# Patient Record
Sex: MALE | Race: WHITE | Employment: FULL TIME | URBAN - METROPOLITAN AREA
[De-identification: names, ages, dates, MRNs, and addresses within clinical notes are randomized per-mention and may not be internally consistent; named-entity substitution may affect disease eponyms.]

---

## 2017-02-20 RX ORDER — BLOOD-GLUCOSE METER
EACH MISCELLANEOUS
COMMUNITY
Start: 2016-02-22

## 2017-02-20 RX ORDER — GEMFIBROZIL 600 MG/1
600 TABLET, FILM COATED ORAL 2 TIMES DAILY
COMMUNITY
Start: 2014-08-20 | End: 2017-05-20

## 2017-02-20 RX ORDER — DILTIAZEM HYDROCHLORIDE 120 MG/1
120 TABLET, FILM COATED ORAL 2 TIMES DAILY
Qty: 180 TABLET | Refills: 3 | Status: SHIPPED | OUTPATIENT
Start: 2017-02-20 | End: 2018-02-10

## 2017-02-20 RX ORDER — GLIMEPIRIDE 4 MG/1
4 TABLET ORAL 2 TIMES DAILY
Qty: 180 TABLET | Refills: 3 | Status: SHIPPED | OUTPATIENT
Start: 2017-02-20 | End: 2017-10-09

## 2017-02-20 RX ORDER — ALBUTEROL SULFATE 90 UG/1
AEROSOL, METERED RESPIRATORY (INHALATION)
COMMUNITY
Start: 2015-01-07 | End: 2017-10-09

## 2017-02-20 RX ORDER — PEN NEEDLE, DIABETIC 32GX 5/32"
32 NEEDLE, DISPOSABLE MISCELLANEOUS 4 TIMES DAILY
Refills: 3 | COMMUNITY
Start: 2017-01-24

## 2017-02-20 RX ORDER — RIVAROXABAN 15 MG/1
15 TABLET, FILM COATED ORAL DAILY
Refills: 3 | COMMUNITY
Start: 2017-01-13 | End: 2020-11-24 | Stop reason: ALTCHOICE

## 2017-02-20 RX ORDER — LINAGLIPTIN 5 MG/1
5 TABLET, FILM COATED ORAL DAILY
Refills: 5 | COMMUNITY
Start: 2017-01-21 | End: 2020-11-24

## 2017-02-20 RX ORDER — LOSARTAN POTASSIUM 50 MG/1
50 TABLET ORAL DAILY
COMMUNITY
Start: 2014-10-17 | End: 2017-08-21

## 2017-02-20 RX ORDER — INSULIN ASPART 100 [IU]/ML
10 INJECTION, SOLUTION INTRAVENOUS; SUBCUTANEOUS 3 TIMES DAILY PRN
Refills: 3 | COMMUNITY
Start: 2016-12-29 | End: 2018-01-05

## 2017-02-20 RX ORDER — LANCETS
EACH MISCELLANEOUS
COMMUNITY
Start: 2014-08-20

## 2017-02-20 RX ORDER — DILTIAZEM HYDROCHLORIDE 120 MG/1
120 TABLET, FILM COATED ORAL 2 TIMES DAILY
COMMUNITY
Start: 2014-08-22 | End: 2017-02-20

## 2017-02-20 RX ORDER — GLIMEPIRIDE 4 MG/1
4 TABLET ORAL 2 TIMES DAILY
COMMUNITY
Start: 2016-02-05 | End: 2017-02-20

## 2017-02-20 RX ORDER — DIGOXIN 125 MCG
125 TABLET ORAL DAILY
COMMUNITY
Start: 2014-08-20 | End: 2018-10-18 | Stop reason: ALTCHOICE

## 2017-02-20 RX ORDER — TAMSULOSIN HYDROCHLORIDE 0.4 MG/1
0.4 CAPSULE ORAL DAILY
COMMUNITY
Start: 2016-08-04 | End: 2017-06-16

## 2017-02-20 RX ORDER — ACETAMINOPHEN 500 MG
500 TABLET ORAL DAILY PRN
COMMUNITY
Start: 2014-08-22

## 2017-02-20 RX ORDER — PEN NEEDLE, DIABETIC 31 GX5/16"
31 NEEDLE, DISPOSABLE MISCELLANEOUS DAILY
Refills: 5 | COMMUNITY
Start: 2016-12-24

## 2017-02-20 NOTE — TELEPHONE ENCOUNTER
No future appointments.   LOV:12/22/16  Return to clinic in 2 months  Suggested date of next visit: 02/22/2017

## 2017-05-22 RX ORDER — GEMFIBROZIL 600 MG/1
TABLET, FILM COATED ORAL
Qty: 180 TABLET | Refills: 0 | Status: SHIPPED | OUTPATIENT
Start: 2017-05-22 | End: 2017-08-20

## 2017-06-16 RX ORDER — TAMSULOSIN HYDROCHLORIDE 0.4 MG/1
0.4 CAPSULE ORAL DAILY
Qty: 60 CAPSULE | Refills: 1 | Status: SHIPPED | OUTPATIENT
Start: 2017-06-16 | End: 2017-10-16

## 2017-06-16 NOTE — TELEPHONE ENCOUNTER
RF request from [facility], [facility] for Tamsulosin. Please advise RF.  Alea Stephens, 06/16/2017, 8:06 AM    Last Labs:  12/20/2016  Last OV:  12/22/2016  Alea Stephens, 06/16/2017, 8:06 AM

## 2017-08-20 DIAGNOSIS — E78.5 DYSLIPIDEMIA: Primary | ICD-10-CM

## 2017-08-21 RX ORDER — LOSARTAN POTASSIUM 50 MG/1
50 TABLET ORAL DAILY
Qty: 30 TABLET | Refills: 0 | Status: SHIPPED | OUTPATIENT
Start: 2017-08-21 | End: 2017-09-17

## 2017-08-21 RX ORDER — GEMFIBROZIL 600 MG/1
TABLET, FILM COATED ORAL
Qty: 180 TABLET | Refills: 0 | Status: SHIPPED | OUTPATIENT
Start: 2017-08-21 | End: 2017-10-09

## 2017-08-21 NOTE — TELEPHONE ENCOUNTER
Patient informed of the below recommendations. Patient will c/b to schedule an appt.  Jose Hogue, 08/21/17, 1:39 PM

## 2017-08-21 NOTE — TELEPHONE ENCOUNTER
Last Labs:  12/20/2016  Last OV:  12/22/2016  Last RF:  1/10/2017  No future appointments.   Julien Stuart, 08/21/17, 7:05 AM

## 2017-09-18 RX ORDER — LOSARTAN POTASSIUM 50 MG/1
TABLET ORAL
Qty: 30 TABLET | Refills: 0 | Status: SHIPPED | OUTPATIENT
Start: 2017-09-18 | End: 2017-11-24

## 2017-09-18 NOTE — TELEPHONE ENCOUNTER
Patient informed of the below recommendations. Patient states he will c/b in the next 10 mins or so to schedule the appts.

## 2017-09-18 NOTE — TELEPHONE ENCOUNTER
Future appt:  None   Last Appointment:  12/22/2016; Suggested date of next visit: 02/22/2017  No results found for: CHOLEST, HDL, LDL, TRIGLY, TRIG  No results found for: EAG, A1C  No results found for: T4F, TSH, TSHT4  Last Labs:  12/20/2016  Last RF:  8/

## 2017-09-30 ENCOUNTER — APPOINTMENT (OUTPATIENT)
Dept: LAB | Age: 52
End: 2017-09-30
Attending: FAMILY MEDICINE
Payer: COMMERCIAL

## 2017-09-30 DIAGNOSIS — E78.5 DYSLIPIDEMIA: ICD-10-CM

## 2017-09-30 PROCEDURE — 36415 COLL VENOUS BLD VENIPUNCTURE: CPT | Performed by: FAMILY MEDICINE

## 2017-09-30 PROCEDURE — 80061 LIPID PANEL: CPT | Performed by: FAMILY MEDICINE

## 2017-09-30 PROCEDURE — 80053 COMPREHEN METABOLIC PANEL: CPT | Performed by: FAMILY MEDICINE

## 2017-10-02 ENCOUNTER — TELEPHONE (OUTPATIENT)
Dept: FAMILY MEDICINE CLINIC | Facility: CLINIC | Age: 52
End: 2017-10-02

## 2017-10-02 NOTE — TELEPHONE ENCOUNTER
----- Message from Pedro Albarran MD sent at 9/30/2017  7:22 PM CDT -----  Kidney function tble  Sugar sl high, managed by Michael  Will discuss further at appt

## 2017-10-02 NOTE — TELEPHONE ENCOUNTER
Your appointments     Date & Time Appointment Department [facility])    Oct 09, 2017  2:00 PM CDT Physical - Established Patient with Earney Schirmer, MD [address] [facility], [facility]        [address]

## 2017-10-09 ENCOUNTER — OFFICE VISIT (OUTPATIENT)
Dept: FAMILY MEDICINE CLINIC | Facility: CLINIC | Age: 52
End: 2017-10-09

## 2017-10-09 VITALS
HEIGHT: 71.5 IN | TEMPERATURE: 99 F | BODY MASS INDEX: 30.56 KG/M2 | RESPIRATION RATE: 16 BRPM | HEART RATE: 80 BPM | DIASTOLIC BLOOD PRESSURE: 78 MMHG | WEIGHT: 223.19 LBS | SYSTOLIC BLOOD PRESSURE: 132 MMHG

## 2017-10-09 DIAGNOSIS — I48.0 PAROXYSMAL ATRIAL FIBRILLATION (HCC): ICD-10-CM

## 2017-10-09 DIAGNOSIS — Z79.4 TYPE 2 DIABETES MELLITUS WITHOUT COMPLICATION, WITH LONG-TERM CURRENT USE OF INSULIN (HCC): ICD-10-CM

## 2017-10-09 DIAGNOSIS — E11.9 TYPE 2 DIABETES MELLITUS WITHOUT COMPLICATION, WITH LONG-TERM CURRENT USE OF INSULIN (HCC): ICD-10-CM

## 2017-10-09 DIAGNOSIS — Z00.00 HEALTH CARE MAINTENANCE: Primary | ICD-10-CM

## 2017-10-09 DIAGNOSIS — D22.9 CHANGE IN MOLE: ICD-10-CM

## 2017-10-09 PROCEDURE — 90471 IMMUNIZATION ADMIN: CPT | Performed by: FAMILY MEDICINE

## 2017-10-09 PROCEDURE — 99396 PREV VISIT EST AGE 40-64: CPT | Performed by: FAMILY MEDICINE

## 2017-10-09 PROCEDURE — 90686 IIV4 VACC NO PRSV 0.5 ML IM: CPT | Performed by: FAMILY MEDICINE

## 2017-10-09 RX ORDER — FLUTICASONE PROPIONATE 50 MCG
2 SPRAY, SUSPENSION (ML) NASAL AS NEEDED
COMMUNITY

## 2017-10-09 RX ORDER — MULTIVITAMIN WITH IRON
1 TABLET ORAL DAILY
COMMUNITY
End: 2018-12-26

## 2017-10-09 RX ORDER — PROPAFENONE HYDROCHLORIDE 150 MG/1
1 TABLET, FILM COATED ORAL 3 TIMES DAILY
Refills: 1 | COMMUNITY
Start: 2017-08-22 | End: 2019-10-23 | Stop reason: ALTCHOICE

## 2017-10-09 RX ORDER — GEMFIBROZIL 600 MG/1
600 TABLET, FILM COATED ORAL 2 TIMES DAILY
Qty: 180 TABLET | Refills: 1 | Status: SHIPPED | OUTPATIENT
Start: 2017-10-09 | End: 2018-05-15

## 2017-10-09 NOTE — PATIENT INSTRUCTIONS
Flu vaccine today  Needs dermatology eval of lesion to right side of nose  Needs repair of herina  Continue f/u with Endo for DM  Recheck lipid panel next march

## 2017-10-10 PROBLEM — D22.9 CHANGE IN MOLE: Status: ACTIVE | Noted: 2017-10-10

## 2017-10-11 NOTE — PROGRESS NOTES
[facility] [place]  PROGRESS NOTE  Chief Complaint:   Patient presents with:  Physical  Lab Results: 9/30  Imm/Inj: Flu shot      HPI:   This is a 47year old male coming in for yearly physical.  Overall patient states feels pretty good reports Bilirubin, Total 0.6 0.1 - 2.0 mg/dL   Total Protein 8.0 6.1 - 8.3 g/dL   Albumin 4.1 3.5 - 4.8 g/dL   Sodium 139 136 - 144 mmol/L   Potassium 4.1 3.6 - 5.1 mmol/L   Chloride 105 101 - 111 mmol/L   CO2 24.0 22.0 - 32.0 mmol/L   -LIPID PANEL   Result Valu BY MOUTH DAILY Disp: 30 tablet Rfl: 0   tamsulosin HCl (FLOMAX) 0.4 MG Oral Cap Take 1 capsule (0.4 mg total) by mouth daily. Disp: 60 capsule Rfl: 1   acetaminophen (TYLENOL EXTRA STRENGTH) 500 MG Oral Tab Take 500 mg by mouth daily as needed.  Disp:  Rfl: rest.  RESPIRATORY:  Denies shortness of breath, wheezing, cough or sputum. GASTROINTESTINAL:  Denies abdominal pain, nausea, vomiting, constipation, diarrhea, or blood in stool.   MUSCULOSKELETAL:  Denies weakness, muscle aches, back pain, joint pain, swe biopsy. HEART:  Regular rate and rhythm, no murmurs, rubs or gallops. LUNGS: Clear to auscultation bilterally, no rales/rhonchi/wheezing. CHEST: No tenderness. ABDOMEN:  Soft, nondistended, nontender,, no masses, no hepatosplenomegaly.   BACK: No tender questions, complications, allergies, or worsening or changing symptoms. Patient is to call with any side effects or complications from the treatments as a result of today.      Problem List:  Patient Active Problem List:     Long term current use of antico

## 2017-10-16 RX ORDER — LOSARTAN POTASSIUM 50 MG/1
TABLET ORAL
Qty: 30 TABLET | Refills: 6 | Status: SHIPPED | OUTPATIENT
Start: 2017-10-16 | End: 2018-10-22

## 2017-10-16 RX ORDER — TAMSULOSIN HYDROCHLORIDE 0.4 MG/1
CAPSULE ORAL
Qty: 60 CAPSULE | Refills: 6 | Status: SHIPPED | OUTPATIENT
Start: 2017-10-16 | End: 2018-12-26

## 2017-10-16 NOTE — TELEPHONE ENCOUNTER
Future appt:    Last Appointment:  10/9/2017    Cholesterol, Total (mg/dL)   Date Value   09/30/2017 183   ----------  HDL Cholesterol (mg/dL)   Date Value   09/30/2017 36 (L)   ----------  LDL Cholesterol (mg/dL)   Date Value   09/30/2017 109   ----------

## 2017-11-17 RX ORDER — GEMFIBROZIL 600 MG/1
TABLET, FILM COATED ORAL
Qty: 180 TABLET | Refills: 0 | OUTPATIENT
Start: 2017-11-17

## 2017-11-17 NOTE — TELEPHONE ENCOUNTER
Gemfibrozil last RF was 10/9/17 180 tab, 1 refill. Should not be due for a refill.      Called pharmacy and they said that they had already taken care of it and it was a duplicate request.

## 2017-11-24 ENCOUNTER — OFFICE VISIT (OUTPATIENT)
Dept: FAMILY MEDICINE CLINIC | Facility: CLINIC | Age: 52
End: 2017-11-24

## 2017-11-24 VITALS
TEMPERATURE: 98 F | SYSTOLIC BLOOD PRESSURE: 132 MMHG | WEIGHT: 226.19 LBS | HEIGHT: 71.5 IN | RESPIRATION RATE: 20 BRPM | BODY MASS INDEX: 30.97 KG/M2 | HEART RATE: 76 BPM | DIASTOLIC BLOOD PRESSURE: 72 MMHG

## 2017-11-24 DIAGNOSIS — H60.12 CELLULITIS OF LEFT EAR: Primary | ICD-10-CM

## 2017-11-24 DIAGNOSIS — Z79.4 TYPE 2 DIABETES MELLITUS WITHOUT COMPLICATION, WITH LONG-TERM CURRENT USE OF INSULIN (HCC): ICD-10-CM

## 2017-11-24 DIAGNOSIS — E11.9 TYPE 2 DIABETES MELLITUS WITHOUT COMPLICATION, WITH LONG-TERM CURRENT USE OF INSULIN (HCC): ICD-10-CM

## 2017-11-24 PROCEDURE — 99214 OFFICE O/P EST MOD 30 MIN: CPT | Performed by: FAMILY MEDICINE

## 2017-11-24 RX ORDER — AMOXICILLIN AND CLAVULANATE POTASSIUM 875; 125 MG/1; MG/1
1 TABLET, FILM COATED ORAL 2 TIMES DAILY
Qty: 20 TABLET | Refills: 0 | Status: SHIPPED | OUTPATIENT
Start: 2017-11-24 | End: 2017-12-04

## 2017-11-24 RX ORDER — CLINDAMYCIN HYDROCHLORIDE 150 MG/1
1 CAPSULE ORAL EVERY 12 HOURS
Refills: 0 | COMMUNITY
Start: 2017-11-20 | End: 2018-01-05 | Stop reason: ALTCHOICE

## 2017-11-24 NOTE — PROGRESS NOTES
Chief Complaint:   Patient presents with:  Ear Pain: left side, ringing in both ears. HPI:   This is a 47year old male coming in for pain in the left ear canal.    Symptoms have been present for 5-7 days. Patient initially noted pimple.     He has Glucose Blood (DILLAN CONTOUR NEXT TEST) In Vitro Strip  Disp:  Rfl:    Glucose Blood (ACCU-CHEK SMARTVIEW) In Vitro Strip  Disp:  Rfl:    Insulin Glargine (LANTUS SOLOSTAR) 100 UNIT/ML Subcutaneous Solution Pen-injector Take 10 Units by mouth nightly.  Becki Hyatt groomed. Physical Exam:    GEN:  Patient is alert, awake and oriented, well developed, well nourished  male   , no apparent distress.   HEENT:  Head:  Normocephalic, atraumatic     Eyes: EOMI, PERRLA, no scleral icterus, conjunctivae clear bilaterally, n

## 2017-12-04 ENCOUNTER — TELEPHONE (OUTPATIENT)
Dept: FAMILY MEDICINE CLINIC | Facility: CLINIC | Age: 52
End: 2017-12-04

## 2017-12-04 NOTE — TELEPHONE ENCOUNTER
Patient states that he stopped in the other day and stopped at the  and asked to speak to Dr. Samara Mclaughlin nurse about stopping his medication for up coming procedure.   He was told she was away from her desk but would leave a message for her to retu

## 2017-12-04 NOTE — TELEPHONE ENCOUNTER
Pt states he called a few days ago and has not received a call back. Having surgery and needs to know when to stop his meds. Please call back this afternoon.

## 2017-12-11 ENCOUNTER — TELEPHONE (OUTPATIENT)
Dept: FAMILY MEDICINE CLINIC | Facility: CLINIC | Age: 52
End: 2017-12-11

## 2017-12-11 ENCOUNTER — OFFICE VISIT (OUTPATIENT)
Dept: FAMILY MEDICINE CLINIC | Facility: CLINIC | Age: 52
End: 2017-12-11

## 2017-12-11 VITALS
SYSTOLIC BLOOD PRESSURE: 120 MMHG | BODY MASS INDEX: 30 KG/M2 | OXYGEN SATURATION: 97 % | DIASTOLIC BLOOD PRESSURE: 62 MMHG | HEART RATE: 92 BPM | WEIGHT: 218.63 LBS | TEMPERATURE: 98 F

## 2017-12-11 DIAGNOSIS — R33.9 URINARY RETENTION: Primary | ICD-10-CM

## 2017-12-11 PROBLEM — N28.89 RENAL MASS: Status: ACTIVE | Noted: 2017-12-11

## 2017-12-11 PROBLEM — C64.9 RENAL CELL CARCINOMA (HCC): Status: ACTIVE | Noted: 2017-02-01

## 2017-12-11 PROBLEM — K43.2 INCISIONAL HERNIA: Status: ACTIVE | Noted: 2017-10-24

## 2017-12-11 PROCEDURE — 51701 INSERT BLADDER CATHETER: CPT | Performed by: NURSE PRACTITIONER

## 2017-12-11 PROCEDURE — 99214 OFFICE O/P EST MOD 30 MIN: CPT | Performed by: NURSE PRACTITIONER

## 2017-12-11 PROCEDURE — 81003 URINALYSIS AUTO W/O SCOPE: CPT | Performed by: NURSE PRACTITIONER

## 2017-12-11 RX ORDER — CEPHALEXIN 500 MG/1
500 CAPSULE ORAL
COMMUNITY
Start: 2017-12-08 | End: 2018-01-05 | Stop reason: ALTCHOICE

## 2017-12-11 RX ORDER — HYDROCODONE BITARTRATE AND ACETAMINOPHEN 5; 325 MG/1; MG/1
1 TABLET ORAL
COMMUNITY
Start: 2017-12-08 | End: 2018-10-18 | Stop reason: ALTCHOICE

## 2017-12-11 RX ORDER — DOCUSATE SODIUM 100 MG/1
100 CAPSULE, LIQUID FILLED ORAL
COMMUNITY
Start: 2017-12-08 | End: 2018-01-05 | Stop reason: ALTCHOICE

## 2017-12-11 NOTE — PROGRESS NOTES
Landon Silva is a 47year old male. Patient presents with:  Urinary: pressure       HPI:   Complaints of urinary pressure-   Had surgery Thursday (12/7) morning- felt like he couldn't empty bladder since then.   He did have a hackett   No dysuria, no blood, MG Oral Tab Take 1 tablet by mouth 3 (three) times daily. Disp:  Rfl: 1   Fluticasone Propionate 50 MCG/ACT Nasal Suspension 2 sprays by Each Nare route as needed for Rhinitis.  Disp:  Rfl:    gemfibrozil 600 MG Oral Tab Take 1 tablet (600 mg total) by mout tobacco: Never Used                      Alcohol use:  Yes              Comment: rarely     Family History   Problem Relation Age of Onset   • Hypertension Father    • Thyroid disease Father    • Heart Disorder Father    • Hypertension Sister    • Thyroid d retention  (primary encounter diagnosis)      Orders Placed This Encounter      UA/M With Culture Reflex [E]      Urine Dip, auto without Micro    Meds & Refills for this Visit:    No prescriptions requested or ordered in this encounter       Imaging & Con

## 2017-12-11 NOTE — TELEPHONE ENCOUNTER
I am leaving for the day- please see if patient  Has voided- if he is unable to void - he can call Dr. Rory Contreras office [phone]. If he is not able to get an appointment - or if he is very uncomfortable he should go to the urgent care or ER.

## 2017-12-11 NOTE — PATIENT INSTRUCTIONS
Discussed with patient  That this is likely due to recent indwelling catheter, if he is still unable to void- he should call us this afternoon- I would like him to see a urologist for evaluation and possible insertion of indwelling catheter.

## 2017-12-11 NOTE — TELEPHONE ENCOUNTER
Patient states he has not urinated since being straight cath but he has only drank one bottle of water but will call and let us know when he does.  Expressed understanding

## 2017-12-12 ENCOUNTER — TELEPHONE (OUTPATIENT)
Dept: FAMILY MEDICINE CLINIC | Facility: CLINIC | Age: 52
End: 2017-12-12

## 2017-12-12 NOTE — TELEPHONE ENCOUNTER
Patient's wife Fahad Monique referred patient to Dr. Sarina Tavarez. Informed wife I will contact his office and return her call. Spoke with Marielos Jackson at Dr. Marisa Manzano office who will see if patient can be worked in and return my call.      Patient's wi

## 2017-12-12 NOTE — TELEPHONE ENCOUNTER
----- Message from ISIS Sheppard sent at 12/12/2017  9:36 AM CST -----  Please notify patient that his urinalysis is normal–he does not have a urinary tract infection. Please asked patient if he is able to void on his own.   (He was straight cathed

## 2017-12-12 NOTE — TELEPHONE ENCOUNTER
Patient's wife Suzie Pollard states patient was seen yesterday by Kiersten Cuello because he has been having an abnormal urine flow since he came home from the hospital on Friday. States Kiersten Cuello put a catheter in but could not leave the catheter in due to protocol.   Wife s

## 2017-12-13 NOTE — TELEPHONE ENCOUNTER
Lowry Bamberger referred patient to see Dr. Ignacia Rivera. However, he is not able to be seen until jan 8th. Patient needs to be seen sooner.  Do you recommend another urologist? Please advise

## 2017-12-14 NOTE — TELEPHONE ENCOUNTER
Keflex does get into the urine and would be helpful, however typically cathaters do not get infected.   Push fluids  Careful with showering not to pull on tube, wash like normal

## 2017-12-14 NOTE — TELEPHONE ENCOUNTER
Gabriela Sofia states that they are unable to get the patient in urgently due to the holidays coming up and both providers are taking week long vacations.  Gabriela Sofia states that if he is still having urinary difficulties to go back to the ER if not they do have him

## 2017-12-14 NOTE — TELEPHONE ENCOUNTER
The 20th is much better, if pt not able to urinate at all, recommend ER, then they will get f/u in 1-2 days

## 2017-12-14 NOTE — TELEPHONE ENCOUNTER
Wife is very disappointed that the patient has to wait until the 20th for an appt. Explained to her that this is the best they could do considering that the holidays are coming up and that both urologists are taking time off.  Wife is concerned about going

## 2017-12-14 NOTE — TELEPHONE ENCOUNTER
Let pt's wife know the following below. Pt's wife verbalized her understanding and had no other questions at this time.

## 2017-12-14 NOTE — TELEPHONE ENCOUNTER
Can we check with Dr Velazquez [facility] office to see if he  Or Dr Ailyn Gu can see urgently due to urinary retention since having surgery

## 2017-12-28 ENCOUNTER — TELEPHONE (OUTPATIENT)
Dept: FAMILY MEDICINE CLINIC | Facility: CLINIC | Age: 52
End: 2017-12-28

## 2017-12-28 DIAGNOSIS — R33.9 URINARY RETENTION: Primary | ICD-10-CM

## 2017-12-28 DIAGNOSIS — Z01.818 PREOPERATIVE CLEARANCE: ICD-10-CM

## 2017-12-28 NOTE — TELEPHONE ENCOUNTER
Received fax from [facility]  Patient having a photoselective vaporization of prostate with Dr. Emi Mcclelland on 1/12/17  Patient needs pre-op clearance, labs, and chest xray  Reviewed with Dr. Mervin Gomez lab and xray appt for tomorrow and appt with

## 2017-12-29 ENCOUNTER — HOSPITAL ENCOUNTER (OUTPATIENT)
Dept: GENERAL RADIOLOGY | Age: 52
Discharge: HOME | End: 2017-12-29
Attending: FAMILY MEDICINE
Payer: COMMERCIAL

## 2017-12-29 ENCOUNTER — LABORATORY ENCOUNTER (OUTPATIENT)
Dept: LAB | Age: 52
End: 2017-12-29
Attending: FAMILY MEDICINE
Payer: COMMERCIAL

## 2017-12-29 DIAGNOSIS — R33.9 URINARY RETENTION: ICD-10-CM

## 2017-12-29 DIAGNOSIS — Z01.818 PREOPERATIVE CLEARANCE: ICD-10-CM

## 2017-12-29 PROCEDURE — 85025 COMPLETE CBC W/AUTO DIFF WBC: CPT | Performed by: FAMILY MEDICINE

## 2017-12-29 PROCEDURE — 85610 PROTHROMBIN TIME: CPT | Performed by: FAMILY MEDICINE

## 2017-12-29 PROCEDURE — 36415 COLL VENOUS BLD VENIPUNCTURE: CPT | Performed by: FAMILY MEDICINE

## 2017-12-29 PROCEDURE — 87186 SC STD MICRODIL/AGAR DIL: CPT | Performed by: FAMILY MEDICINE

## 2017-12-29 PROCEDURE — 85730 THROMBOPLASTIN TIME PARTIAL: CPT | Performed by: FAMILY MEDICINE

## 2017-12-29 PROCEDURE — 80053 COMPREHEN METABOLIC PANEL: CPT | Performed by: FAMILY MEDICINE

## 2017-12-29 PROCEDURE — 71020 XR CHEST PA + LAT CHEST (CPT=71020): CPT | Performed by: FAMILY MEDICINE

## 2017-12-29 PROCEDURE — 87088 URINE BACTERIA CULTURE: CPT | Performed by: FAMILY MEDICINE

## 2017-12-29 PROCEDURE — 87086 URINE CULTURE/COLONY COUNT: CPT | Performed by: FAMILY MEDICINE

## 2018-01-05 ENCOUNTER — OFFICE VISIT (OUTPATIENT)
Dept: FAMILY MEDICINE CLINIC | Facility: CLINIC | Age: 53
End: 2018-01-05

## 2018-01-05 VITALS
WEIGHT: 218 LBS | RESPIRATION RATE: 16 BRPM | TEMPERATURE: 98 F | DIASTOLIC BLOOD PRESSURE: 68 MMHG | HEIGHT: 70.5 IN | SYSTOLIC BLOOD PRESSURE: 124 MMHG | BODY MASS INDEX: 30.86 KG/M2 | HEART RATE: 92 BPM

## 2018-01-05 DIAGNOSIS — Z01.818 PREOP EXAMINATION: Primary | ICD-10-CM

## 2018-01-05 DIAGNOSIS — N30.00 ACUTE CYSTITIS WITHOUT HEMATURIA: ICD-10-CM

## 2018-01-05 DIAGNOSIS — R33.9 URINARY RETENTION: ICD-10-CM

## 2018-01-05 PROBLEM — N28.89 RENAL MASS: Status: RESOLVED | Noted: 2017-12-11 | Resolved: 2018-01-05

## 2018-01-05 PROBLEM — D03.30 MELANOMA IN SITU OF FACE (HCC): Status: ACTIVE | Noted: 2017-12-13

## 2018-01-05 PROBLEM — H60.12 CELLULITIS OF LEFT EAR: Status: RESOLVED | Noted: 2017-11-24 | Resolved: 2018-01-05

## 2018-01-05 PROBLEM — D22.9 CHANGE IN MOLE: Status: RESOLVED | Noted: 2017-10-10 | Resolved: 2018-01-05

## 2018-01-05 PROCEDURE — 87086 URINE CULTURE/COLONY COUNT: CPT | Performed by: FAMILY MEDICINE

## 2018-01-05 PROCEDURE — 99214 OFFICE O/P EST MOD 30 MIN: CPT | Performed by: FAMILY MEDICINE

## 2018-01-05 RX ORDER — SULFAMETHOXAZOLE AND TRIMETHOPRIM 800; 160 MG/1; MG/1
1 TABLET ORAL 2 TIMES DAILY
Refills: 0 | COMMUNITY
Start: 2018-01-01 | End: 2018-01-19 | Stop reason: ALTCHOICE

## 2018-01-05 RX ORDER — FINASTERIDE 5 MG/1
1 TABLET, FILM COATED ORAL DAILY
Refills: 6 | COMMUNITY
Start: 2017-12-20 | End: 2018-10-18 | Stop reason: ALTCHOICE

## 2018-01-05 NOTE — PROGRESS NOTES
[address]  PROGRESS NOTE  Chief Complaint:   Patient presents with:  Pre-Op Exam: Dr. Julia Dobbins on 1-12-17.        HPI:   This is a 47year old male coming in for preop clearance for upcoming prostate surgery to be done by Dr. Julia Dobbins at [facility] test   by cardiology Dr. Linus Harrison on January 8 for his preoperative clearance from cardiology. Patient denies any recent illnesses. Patient understands the procedure be done.   Patient is a known diabetic his blood sugars that she had been doing quite well Granulocyte Absolute 0.05 0.00 - 1.00 x10(3) uL   Neutrophil % 74.6 %   Lymphocyte % 15.0 %   Monocyte % 5.8 %   Eosinophil % 3.2 %   Basophil % 0.8 %   Immature Granulocyte % 0.6 %       Past Medical History:   Diagnosis Date   • Atrial fibrillation ([facility] [address] DAILY Disp: 30 tablet Rfl: 6   Omega-3 Fatty Acids (FISH OIL CONCENTRATE) 300 MG Oral Cap Take 1 capsule by mouth daily. Disp:  Rfl:    Propafenone HCl 150 MG Oral Tab Take 1 tablet by mouth 3 (three) times daily.  Disp:  Rfl: 1   Fluticasone Propionate 50 Denies chest pain, chest pressure, chest discomfort, palpitations, edema, dyspnea on exertion or at rest.  RESPIRATORY:  Denies shortness of breath, wheezing, cough or sputum.   GASTROINTESTINAL:  Denies abdominal pain, nausea, vomiting, constipation, diarr or gallops. LUNGS: Clear to auscultation bilterally, no rales/rhonchi/wheezing. CHEST: No tenderness. ABDOMEN:  Soft, nondistended, nontender,, no masses, no hepatosplenomegaly. ,  Catheter in place with clear light yellow urine present in the bag.   BACK ([facility] [address]     Nonspecific abnormal results of kidney function study     Type 2 diabetes mellitus without complication, with long-term current use of insulin (HCC)     Familial multiple lipoprotein-type hyperlipidemia     Renal cell carcinoma of right kidney (H

## 2018-01-08 ENCOUNTER — TELEPHONE (OUTPATIENT)
Dept: FAMILY MEDICINE CLINIC | Facility: CLINIC | Age: 53
End: 2018-01-08

## 2018-01-08 NOTE — TELEPHONE ENCOUNTER
----- Message from Benny Jacob MD sent at 1/7/2018  2:05 PM CST -----  Urine culture neg at 24 hours  Please forward preop, labs and CXR to [facility]

## 2018-01-19 ENCOUNTER — OFFICE VISIT (OUTPATIENT)
Dept: FAMILY MEDICINE CLINIC | Facility: CLINIC | Age: 53
End: 2018-01-19

## 2018-01-19 VITALS
DIASTOLIC BLOOD PRESSURE: 70 MMHG | SYSTOLIC BLOOD PRESSURE: 132 MMHG | TEMPERATURE: 97 F | OXYGEN SATURATION: 98 % | WEIGHT: 214.19 LBS | HEART RATE: 106 BPM | BODY MASS INDEX: 30 KG/M2

## 2018-01-19 DIAGNOSIS — J40 BRONCHITIS AFTER SURGERY: Primary | ICD-10-CM

## 2018-01-19 DIAGNOSIS — E11.9 TYPE 2 DIABETES MELLITUS WITHOUT COMPLICATION, WITH LONG-TERM CURRENT USE OF INSULIN (HCC): ICD-10-CM

## 2018-01-19 DIAGNOSIS — J95.89 BRONCHITIS AFTER SURGERY: Primary | ICD-10-CM

## 2018-01-19 DIAGNOSIS — Z79.4 TYPE 2 DIABETES MELLITUS WITHOUT COMPLICATION, WITH LONG-TERM CURRENT USE OF INSULIN (HCC): ICD-10-CM

## 2018-01-19 DIAGNOSIS — R33.9 URINARY RETENTION: ICD-10-CM

## 2018-01-19 PROCEDURE — 99214 OFFICE O/P EST MOD 30 MIN: CPT | Performed by: FAMILY MEDICINE

## 2018-01-19 RX ORDER — SULFAMETHOXAZOLE AND TRIMETHOPRIM 800; 160 MG/1; MG/1
1 TABLET ORAL 2 TIMES DAILY
COMMUNITY
Start: 2018-01-12 | End: 2018-02-11

## 2018-01-19 RX ORDER — SULFAMETHOXAZOLE AND TRIMETHOPRIM 800; 160 MG/1; MG/1
1 TABLET ORAL 2 TIMES DAILY
Qty: 14 TABLET | Refills: 0 | Status: SHIPPED | OUTPATIENT
Start: 2018-01-19 | End: 2018-10-18 | Stop reason: ALTCHOICE

## 2018-01-20 NOTE — PROGRESS NOTES
[address]  PROGRESS NOTE  Chief Complaint:   Patient presents with:   Follow - Up: Still not taking xarelto   Cough  Dizziness      HPI:   This is a 47year old male coming in for evaluation of a cough and some mild dizziness of the past prostate  Social History:  Smoking status: Never Smoker                                                              Smokeless tobacco: Never Used                      Alcohol use:  No               Comment: rarely     Family History:  Family History   Prob Disp:  Rfl:    gemfibrozil 600 MG Oral Tab Take 1 tablet (600 mg total) by mouth 2 (two) times daily. Disp: 180 tablet Rfl: 1   acetaminophen (TYLENOL EXTRA STRENGTH) 500 MG Oral Tab Take 500 mg by mouth daily as needed.  Disp:  Rfl:    Blood Glucose Monito or stiffness. NEUROLOGICAL:  Denies headache, seizures, dizziness, syncope, paralysis, , numbness or tingling in the extremities,change in bowel or bladder control. HEMATOLOGIC:  Denies anemia, bleeding or bruising.   LYMPHATICS:  Denies enlarged nodes or bilaterally. NEURO:  No deficit, normal gait, strength and tone,      ASSESSMENT AND PLAN:   Rebeka Erickson was seen today for follow - up, cough and dizziness.     Diagnoses and all orders for this visit:    Bronchitis after surgery    Urinary retention    Type 2 d Incisional hernia     Renal cell carcinoma ([facility] [address]     Melanoma in situ of face ([facility] [address]      Radha Melton MD  1/20/2018  2:14 PM

## 2018-02-11 RX ORDER — DILTIAZEM HYDROCHLORIDE 120 MG/1
TABLET, FILM COATED ORAL
Qty: 180 TABLET | Refills: 1 | Status: SHIPPED | OUTPATIENT
Start: 2018-02-11 | End: 2018-08-07

## 2018-02-14 NOTE — TELEPHONE ENCOUNTER
Future appt:  None  Last Appointment:  1/19/2018; No f/u recommended    Cholesterol, Total (mg/dL)   Date Value   09/30/2017 183   ----------  HDL Cholesterol (mg/dL)   Date Value   09/30/2017 36 (L)   ----------  LDL Cholesterol (mg/dL)   Date Value   09/

## 2018-02-19 RX ORDER — INSULIN ASPART 100 [IU]/ML
INJECTION, SOLUTION INTRAVENOUS; SUBCUTANEOUS
Qty: 30 ML | Refills: 3 | Status: SHIPPED | OUTPATIENT
Start: 2018-02-19 | End: 2018-10-19

## 2018-05-02 RX ORDER — LOSARTAN POTASSIUM 50 MG/1
TABLET ORAL
Qty: 30 TABLET | Refills: 3 | Status: SHIPPED | OUTPATIENT
Start: 2018-05-02 | End: 2018-10-18

## 2018-05-02 NOTE — TELEPHONE ENCOUNTER
Future appt:    Last Appointment:  1/19/2018    Cholesterol, Total (mg/dL)   Date Value   09/30/2017 183   ----------  HDL Cholesterol (mg/dL)   Date Value   09/30/2017 36 (L)   ----------  LDL Cholesterol (mg/dL)   Date Value   09/30/2017 109   ----------

## 2018-05-15 RX ORDER — GEMFIBROZIL 600 MG/1
TABLET, FILM COATED ORAL
Qty: 180 TABLET | Refills: 0 | Status: SHIPPED | OUTPATIENT
Start: 2018-05-15 | End: 2018-08-07

## 2018-05-15 NOTE — TELEPHONE ENCOUNTER
Future appt:    Last Appointment:  1/19/2018; No f/u recommended    Cholesterol, Total (mg/dL)   Date Value   09/30/2017 183   ----------  HDL Cholesterol (mg/dL)   Date Value   09/30/2017 36 (L)   ----------  LDL Cholesterol (mg/dL)   Date Value   09/30/2

## 2018-08-07 RX ORDER — GEMFIBROZIL 600 MG/1
600 TABLET, FILM COATED ORAL 2 TIMES DAILY
Qty: 180 TABLET | Refills: 0 | Status: SHIPPED | OUTPATIENT
Start: 2018-08-07 | End: 2018-10-22

## 2018-08-07 RX ORDER — DILTIAZEM HYDROCHLORIDE 120 MG/1
TABLET, FILM COATED ORAL
Qty: 180 TABLET | Refills: 0 | Status: SHIPPED | OUTPATIENT
Start: 2018-08-07 | End: 2018-10-22

## 2018-08-07 NOTE — TELEPHONE ENCOUNTER
Please advise refill of Diltiazem 120mg. Last Rx: 2/11/18    Future appt:  None  Last Appointment:  1/5/18 pre-opn no f/u noted.     Cholesterol, Total (mg/dL)   Date Value   09/30/2017 183   ----------  HDL Cholesterol (mg/dL)   Date Value   09/30/2017 36

## 2018-08-07 NOTE — TELEPHONE ENCOUNTER
Pt notified and verbalized understanding. He states that he also needs a refill of Gemfibrozil 600mg.   Last Rx: 5/31/18    Script pending your approval.

## 2018-08-08 RX ORDER — GEMFIBROZIL 600 MG/1
TABLET, FILM COATED ORAL
Qty: 180 TABLET | Refills: 0 | OUTPATIENT
Start: 2018-08-08

## 2018-08-08 NOTE — TELEPHONE ENCOUNTER
90 day supply of gemfibrozil sent 8/7/18 by Dr. Frida Rodrigez to [place] [id number] in [place]. [facility] in [place] sent a duplicate request. Medication denied with this notation.

## 2018-08-24 RX ORDER — LOSARTAN POTASSIUM 50 MG/1
TABLET ORAL
Qty: 30 TABLET | Refills: 1 | Status: SHIPPED | OUTPATIENT
Start: 2018-08-24 | End: 2018-10-18

## 2018-08-24 NOTE — TELEPHONE ENCOUNTER
Please advise refill of Losartan 50mg.   Last Rx: 5/2/18 - pt notified that he is due for f/u this fall    Future appt:  None  Last Appointment:  1/19/18 acute visit no f/u noted       Cholesterol, Total (mg/dL)   Date Value   09/30/2017 183   ----------  H

## 2018-10-18 ENCOUNTER — OFFICE VISIT (OUTPATIENT)
Dept: FAMILY MEDICINE CLINIC | Facility: CLINIC | Age: 53
End: 2018-10-18
Payer: COMMERCIAL

## 2018-10-18 VITALS
DIASTOLIC BLOOD PRESSURE: 72 MMHG | HEART RATE: 90 BPM | SYSTOLIC BLOOD PRESSURE: 138 MMHG | BODY MASS INDEX: 33.41 KG/M2 | TEMPERATURE: 99 F | WEIGHT: 236 LBS | HEIGHT: 70.5 IN

## 2018-10-18 DIAGNOSIS — E78.5 DYSLIPIDEMIA: ICD-10-CM

## 2018-10-18 DIAGNOSIS — E11.9 TYPE 2 DIABETES MELLITUS WITHOUT COMPLICATION, WITH LONG-TERM CURRENT USE OF INSULIN (HCC): ICD-10-CM

## 2018-10-18 DIAGNOSIS — Z00.00 HEALTH CARE MAINTENANCE: Primary | ICD-10-CM

## 2018-10-18 DIAGNOSIS — I48.0 PAROXYSMAL ATRIAL FIBRILLATION (HCC): ICD-10-CM

## 2018-10-18 DIAGNOSIS — Z79.4 TYPE 2 DIABETES MELLITUS WITHOUT COMPLICATION, WITH LONG-TERM CURRENT USE OF INSULIN (HCC): ICD-10-CM

## 2018-10-18 PROCEDURE — 90472 IMMUNIZATION ADMIN EACH ADD: CPT | Performed by: FAMILY MEDICINE

## 2018-10-18 PROCEDURE — 90686 IIV4 VACC NO PRSV 0.5 ML IM: CPT | Performed by: FAMILY MEDICINE

## 2018-10-18 PROCEDURE — 90471 IMMUNIZATION ADMIN: CPT | Performed by: FAMILY MEDICINE

## 2018-10-18 PROCEDURE — 99396 PREV VISIT EST AGE 40-64: CPT | Performed by: FAMILY MEDICINE

## 2018-10-18 PROCEDURE — 90670 PCV13 VACCINE IM: CPT | Performed by: FAMILY MEDICINE

## 2018-10-18 NOTE — PROGRESS NOTES
[facility] [place]  PROGRESS NOTE  Chief Complaint:   Patient presents with:  Physical      HPI:   This is a 46year old male coming in for yearly physical.  Patient has chronic medical problems of type 2 diabetes which he sees endocrinology, pa years ago but has not had a Prevnar 15. Patient also would like to receive his influenza vaccine today.   Patient also was informed that he is eligible for a shingles vaccine which is not received that he will check with insurance for coverage and with the Allergies:    Shellfish-Derived P*    NAUSEA AND VOMITING  Current Meds:    Current Outpatient Medications:  DILTIAZEM  MG Oral Tab TAKE 1 TABLET(120 MG) BY MOUTH TWICE DAILY Disp: 180 tablet Rfl: 0   gemfibrozil 600 MG Oral Tab Take 1 tablet (6 PEN NEEDLE MINI U/F 31G X 5 MM Does not apply Misc Inject 31 g into the skin daily. Disp:  Rfl: 5      Counseling given: Not Answered       REVIEW OF SYSTEMS:   CONSTITUTIONAL:  Denies unusual weight gain/loss, fever, chills, or fatigue. SEE HPI  EENT:  Eye Normocephalic, atraumatic Eyes: EOMI, PERRLA, no scleral icterus, conjunctivae clear bilaterally, no eye discharge Ears: External normal. Nose: patent, no nasal discharge Throat:  No tonsillar erythema or exudate.   Mouth:  No oral lesions or ulcerations, g atrial fibrillation for which he sees cardiology.   Patient does have mild concern about some stiffness in the left thumb that likely represents a mild strain/sprain Chick Landau will treat conservatively with range of motion exercises and Tylenol arthritis and ob MD  10/18/2018  9:23 AM

## 2018-10-18 NOTE — PATIENT INSTRUCTIONS
Schedule fasting labs, 12 hours water only  Regular exercise  Weight loss  Trial tylenol arthritis , range of motion exercise for left thumb  Refill meds after labs completed

## 2018-10-19 ENCOUNTER — LABORATORY ENCOUNTER (OUTPATIENT)
Dept: LAB | Age: 53
End: 2018-10-19
Attending: UROLOGY
Payer: COMMERCIAL

## 2018-10-19 DIAGNOSIS — E78.5 DYSLIPIDEMIA: ICD-10-CM

## 2018-10-19 DIAGNOSIS — E11.9 TYPE 2 DIABETES MELLITUS WITHOUT COMPLICATION, WITH LONG-TERM CURRENT USE OF INSULIN (HCC): ICD-10-CM

## 2018-10-19 DIAGNOSIS — Z00.00 HEALTH CARE MAINTENANCE: ICD-10-CM

## 2018-10-19 DIAGNOSIS — Z79.4 TYPE 2 DIABETES MELLITUS WITHOUT COMPLICATION, WITH LONG-TERM CURRENT USE OF INSULIN (HCC): ICD-10-CM

## 2018-10-19 DIAGNOSIS — R31.9 HEMATURIA, UNSPECIFIED: ICD-10-CM

## 2018-10-19 PROCEDURE — 36415 COLL VENOUS BLD VENIPUNCTURE: CPT | Performed by: FAMILY MEDICINE

## 2018-10-19 PROCEDURE — 84153 ASSAY OF PSA TOTAL: CPT | Performed by: FAMILY MEDICINE

## 2018-10-19 PROCEDURE — 80053 COMPREHEN METABOLIC PANEL: CPT | Performed by: FAMILY MEDICINE

## 2018-10-19 PROCEDURE — 80061 LIPID PANEL: CPT | Performed by: FAMILY MEDICINE

## 2018-10-19 PROCEDURE — 85025 COMPLETE CBC W/AUTO DIFF WBC: CPT | Performed by: FAMILY MEDICINE

## 2018-10-19 RX ORDER — INSULIN ASPART 100 [IU]/ML
INJECTION, SOLUTION INTRAVENOUS; SUBCUTANEOUS
Qty: 30 ML | Refills: 3 | Status: SHIPPED | OUTPATIENT
Start: 2018-10-19 | End: 2019-06-16

## 2018-10-19 NOTE — TELEPHONE ENCOUNTER
Please advise refill of Novolog. Last Rx: 2/19/18    Future appt:     Your appointments     Date & Time Appointment Department [facility])    Oct 19, 2018 10:45 AM CDT Laboratory Visit with [facility] ([facility])        Pablito Morton

## 2018-10-20 ENCOUNTER — TELEPHONE (OUTPATIENT)
Dept: FAMILY MEDICINE CLINIC | Facility: CLINIC | Age: 53
End: 2018-10-20

## 2018-10-20 NOTE — TELEPHONE ENCOUNTER
----- Message from Jack Don MD sent at 10/19/2018  8:40 PM CDT -----  Labs all good  Kidney function stable  No changes in meds

## 2018-10-22 RX ORDER — DILTIAZEM HYDROCHLORIDE 120 MG/1
TABLET, FILM COATED ORAL
Qty: 180 TABLET | Refills: 1 | Status: SHIPPED | OUTPATIENT
Start: 2018-10-22 | End: 2019-04-27

## 2018-10-22 RX ORDER — LOSARTAN POTASSIUM 50 MG/1
TABLET ORAL
Qty: 30 TABLET | Refills: 6 | Status: SHIPPED | OUTPATIENT
Start: 2018-10-22 | End: 2019-10-23

## 2018-10-22 RX ORDER — GEMFIBROZIL 600 MG/1
TABLET, FILM COATED ORAL
Qty: 180 TABLET | Refills: 1 | Status: SHIPPED | OUTPATIENT
Start: 2018-10-22 | End: 2019-04-27

## 2018-10-22 NOTE — TELEPHONE ENCOUNTER
Future appt:     Last Appointment:  10/18/2018; Return in about 1 year (around 10/18/2019).      Cholesterol, Total (mg/dL)   Date Value   10/19/2018 181     HDL Cholesterol (mg/dL)   Date Value   10/19/2018 39 (L)     LDL Cholesterol (mg/dL)   Date Value

## 2018-10-22 NOTE — TELEPHONE ENCOUNTER
Future appt:     Last Appointment:  10/18/2018;    Return in about 1 year (around 10/18/2019).      Cholesterol, Total (mg/dL)   Date Value   10/19/2018 181     HDL Cholesterol (mg/dL)   Date Value   10/19/2018 39 (L)     LDL Cholesterol (mg/dL)   Date Valu

## 2018-10-26 ENCOUNTER — HOSPITAL ENCOUNTER (OUTPATIENT)
Dept: GENERAL RADIOLOGY | Age: 53
Discharge: HOME | End: 2018-10-26
Attending: NURSE PRACTITIONER
Payer: COMMERCIAL

## 2018-10-26 ENCOUNTER — OFFICE VISIT (OUTPATIENT)
Dept: FAMILY MEDICINE CLINIC | Facility: CLINIC | Age: 53
End: 2018-10-26
Payer: COMMERCIAL

## 2018-10-26 VITALS
HEIGHT: 70.5 IN | TEMPERATURE: 98 F | BODY MASS INDEX: 33.22 KG/M2 | DIASTOLIC BLOOD PRESSURE: 70 MMHG | SYSTOLIC BLOOD PRESSURE: 104 MMHG | HEART RATE: 72 BPM | WEIGHT: 234.63 LBS | RESPIRATION RATE: 12 BRPM

## 2018-10-26 DIAGNOSIS — M25.511 ACUTE PAIN OF RIGHT SHOULDER: Primary | ICD-10-CM

## 2018-10-26 DIAGNOSIS — M25.511 ACUTE PAIN OF RIGHT SHOULDER: ICD-10-CM

## 2018-10-26 PROCEDURE — 99213 OFFICE O/P EST LOW 20 MIN: CPT | Performed by: NURSE PRACTITIONER

## 2018-10-26 PROCEDURE — 73030 X-RAY EXAM OF SHOULDER: CPT | Performed by: NURSE PRACTITIONER

## 2018-10-26 NOTE — PROGRESS NOTES
HPI:    Patient ID: Dong Kruger is a 46year old male. HPI    Having pain to the right arm that has been for the past 3 weeks - gradually getting worse especially after golfing. No numbness or tingling. No swelling noted. Is right-handed.    Unable to acetaminophen (TYLENOL EXTRA STRENGTH) 500 MG Oral Tab Take 500 mg by mouth daily as needed.  Disp:  Rfl:    Blood Glucose Monitoring Suppl (ACCU-CHEK EVERTON SMARTVIEW) w/Device Does not apply Kit  Disp:  Rfl:    Glucose Blood (ACCU-CHEK SMARTVIEW) In Vitro S

## 2018-12-20 ENCOUNTER — TELEPHONE (OUTPATIENT)
Dept: FAMILY MEDICINE CLINIC | Facility: CLINIC | Age: 53
End: 2018-12-20

## 2018-12-21 NOTE — TELEPHONE ENCOUNTER
Due to h/u atrial fib will need EKG, is he seeing cardiology. Need to know what labs are requested. I will be gone till ROBER, labs could be done before appt, otherwise could see another provider earlier. Is he off blood thinner?

## 2018-12-21 NOTE — TELEPHONE ENCOUNTER
States that he is having a hernia repair with Dr. Jeison Kaiser on 12/28/18 at [address] [place] [address]. Pt states that he was told by Cardiology that they didn't need to see him. He was told to hold Xarelto 48 hours prior to procedure.  Pt scheduled appt with Dr. Amador Currie for Denisha Heath

## 2018-12-26 ENCOUNTER — TELEPHONE (OUTPATIENT)
Dept: FAMILY MEDICINE CLINIC | Facility: CLINIC | Age: 53
End: 2018-12-26

## 2018-12-26 ENCOUNTER — OFFICE VISIT (OUTPATIENT)
Dept: FAMILY MEDICINE CLINIC | Facility: CLINIC | Age: 53
End: 2018-12-26
Payer: COMMERCIAL

## 2018-12-26 VITALS
RESPIRATION RATE: 16 BRPM | WEIGHT: 236.5 LBS | BODY MASS INDEX: 33.48 KG/M2 | SYSTOLIC BLOOD PRESSURE: 138 MMHG | TEMPERATURE: 96 F | DIASTOLIC BLOOD PRESSURE: 74 MMHG | HEART RATE: 90 BPM | HEIGHT: 70.5 IN

## 2018-12-26 DIAGNOSIS — K43.2 INCISIONAL HERNIA, WITHOUT OBSTRUCTION OR GANGRENE: ICD-10-CM

## 2018-12-26 DIAGNOSIS — Z01.818 PREOPERATIVE EXAMINATION: Primary | ICD-10-CM

## 2018-12-26 DIAGNOSIS — Z79.4 TYPE 2 DIABETES MELLITUS WITHOUT COMPLICATION, WITH LONG-TERM CURRENT USE OF INSULIN (HCC): ICD-10-CM

## 2018-12-26 DIAGNOSIS — E11.9 TYPE 2 DIABETES MELLITUS WITHOUT COMPLICATION, WITH LONG-TERM CURRENT USE OF INSULIN (HCC): ICD-10-CM

## 2018-12-26 DIAGNOSIS — I48.0 PAROXYSMAL ATRIAL FIBRILLATION (HCC): ICD-10-CM

## 2018-12-26 PROCEDURE — 93000 ELECTROCARDIOGRAM COMPLETE: CPT | Performed by: FAMILY MEDICINE

## 2018-12-26 PROCEDURE — 99244 OFF/OP CNSLTJ NEW/EST MOD 40: CPT | Performed by: FAMILY MEDICINE

## 2018-12-26 NOTE — PROGRESS NOTES
[address] [facility] [address]  PRE-OP NOTE    Chief Complaint:   Patient presents with:  Pre-Op Exam: 12/28/18  Diabetes: A1c 6.5      HPI:   Mark Colón is a 46year old male with a hx of incisional hernia, A fib and diabetes type 2, who presents for a p NAUSEA AND VOMITING  Current Meds:    Current Outpatient Medications:  LOSARTAN POTASSIUM 50 MG Oral Tab TAKE 1 TABLET(50 MG) BY MOUTH DAILY Disp: 30 tablet Rfl: 6   GEMFIBROZIL 600 MG Oral Tab TAKE 1 TABLET(600 MG) BY MOUTH TWICE DAILY Disp: 180 tablet Eyes:  Denies eye pain, visual loss, blurred vision, double vision or yellow sclerae. Ears, Nose, Throat:  Denies hearing loss, sneezing, congestion, runny nose or sore throat.   INTEGUMENTARY:  Denies rashes, itching, skin lesion, or excessive skin dryness No tonsillar erythema or exudate. Mouth:  No oral lesions or ulcerations, good dentition. NECK: Supple, no CLAD, no JVD, no carotid bruit, no thyromegaly. SKIN: No rashes, no skin lesion, no bruising, good turgor.   HEART:  Regular rate and rhythm, no mu 3 - PPSV23) due on 12/13/2018    Patient/Caregiver Education: Patient/Caregiver Education: There are no barriers to learning. Medical education done. Outcome: Patient verbalizes understanding.  Patient is notified to call with any questions, complications

## 2018-12-26 NOTE — TELEPHONE ENCOUNTER
Needs patients recent labs, ekg and pre op px fax to [phone]. Would like as soon as possible, patients surgery is this Friday.

## 2018-12-26 NOTE — PATIENT INSTRUCTIONS
After today's assessment  patient is at optimum health for surgery and relatively at low risk. There are no contraindication for procedure.    Will have labs at [address]   Hold diabetes medication in morning of surgery  Take half of lantus at night

## 2018-12-27 ENCOUNTER — TELEPHONE (OUTPATIENT)
Dept: FAMILY MEDICINE CLINIC | Facility: CLINIC | Age: 53
End: 2018-12-27

## 2019-01-21 ENCOUNTER — OFFICE VISIT (OUTPATIENT)
Dept: FAMILY MEDICINE CLINIC | Facility: CLINIC | Age: 54
End: 2019-01-21
Payer: COMMERCIAL

## 2019-01-21 VITALS
HEART RATE: 92 BPM | TEMPERATURE: 97 F | SYSTOLIC BLOOD PRESSURE: 120 MMHG | OXYGEN SATURATION: 99 % | BODY MASS INDEX: 32 KG/M2 | WEIGHT: 228 LBS | DIASTOLIC BLOOD PRESSURE: 72 MMHG

## 2019-01-21 DIAGNOSIS — J98.01 BRONCHOSPASM: Primary | ICD-10-CM

## 2019-01-21 PROCEDURE — 99214 OFFICE O/P EST MOD 30 MIN: CPT | Performed by: NURSE PRACTITIONER

## 2019-01-21 RX ORDER — ALBUTEROL SULFATE 90 UG/1
2 AEROSOL, METERED RESPIRATORY (INHALATION) EVERY 4 HOURS PRN
Qty: 1 INHALER | Refills: 0 | Status: SHIPPED | OUTPATIENT
Start: 2019-01-21

## 2019-01-21 NOTE — PATIENT INSTRUCTIONS
Use inhaler as directed. Fluids and rest.   Treat symptoms as needed. Try some Mucinex DM or Delsym. Call if not improving, would consider a course of antibiotics. Otherwise follow-up as needed.

## 2019-01-21 NOTE — PROGRESS NOTES
HPI:    Patient ID: Sonia Lynne is a 46year old male. HPI     Started with congestion a few days ago. Tends to get this frequently. Phlegm (green) yesterday. Cold tends to aggrevate the cough. No fever or chills.    Had hernia surgery 12/28 and had a Monitoring Suppl (ACCU-CHEK EVERTON SMARTVIEW) w/Device Does not apply Kit  Disp:  Rfl:    Glucose Blood (ACCU-CHEK SMARTVIEW) In Vitro Strip  Disp:  Rfl:    Insulin Pen Needle (CLICKFINE PEN NEEDLES) 31G X 6 MM Does not apply Misc  Disp:  Rfl:    ACCU-CHEK F Lymphadenopathy:     He has no cervical adenopathy. Neurological: He is alert and oriented to person, place, and time. Skin: Skin is warm and dry. Psychiatric: He has a normal mood and affect.  His behavior is normal. Judgment and thought content no

## 2019-04-01 RX ORDER — BLOOD SUGAR DIAGNOSTIC
STRIP MISCELLANEOUS
Qty: 400 STRIP | Refills: 3 | Status: SHIPPED | OUTPATIENT
Start: 2019-04-01 | End: 2020-11-18

## 2019-04-22 RX ORDER — LOSARTAN POTASSIUM 50 MG/1
TABLET ORAL
Qty: 90 TABLET | Refills: 1 | Status: SHIPPED | OUTPATIENT
Start: 2019-04-22 | End: 2019-10-23 | Stop reason: ALTCHOICE

## 2019-04-22 NOTE — TELEPHONE ENCOUNTER
Future appt:    Last Appointment:      Last px: 10/18/18- pt advised to return in one year    Losartan refilled 10/22/18 for 6 month supply    Cholesterol, Total (mg/dL)   Date Value   10/19/2018 181     HDL Cholesterol (mg/dL)   Date Value   10/19/2018 39

## 2019-04-28 RX ORDER — GEMFIBROZIL 600 MG/1
TABLET, FILM COATED ORAL
Qty: 180 TABLET | Refills: 1 | Status: SHIPPED | OUTPATIENT
Start: 2019-04-28 | End: 2019-10-24

## 2019-04-28 RX ORDER — DILTIAZEM HYDROCHLORIDE 120 MG/1
TABLET, FILM COATED ORAL
Qty: 180 TABLET | Refills: 1 | Status: SHIPPED | OUTPATIENT
Start: 2019-04-28 | End: 2019-10-24

## 2019-05-20 RX ORDER — LOSARTAN POTASSIUM 50 MG/1
TABLET ORAL
Qty: 90 TABLET | Refills: 1 | Status: SHIPPED | OUTPATIENT
Start: 2019-05-20 | End: 2019-11-18

## 2019-05-20 NOTE — TELEPHONE ENCOUNTER
Future appt:    Last Appointment:      Losartan refilled on 4/22/19 for 6 month supply    Called pharmacy, they said they never received RX. Please resend.     Cholesterol, Total (mg/dL)   Date Value   10/19/2018 181     HDL Cholesterol (mg/dL)   Date Valu

## 2019-06-17 RX ORDER — INSULIN ASPART 100 [IU]/ML
INJECTION, SOLUTION INTRAVENOUS; SUBCUTANEOUS
Qty: 30 ML | Refills: 0 | Status: SHIPPED | OUTPATIENT
Start: 2019-06-17 | End: 2019-08-26

## 2019-06-17 NOTE — TELEPHONE ENCOUNTER
Future appt:    Last Appointment:      Last px exam: 10/18/18-  Pt was advised then to return in one year  Novolog was refilled on 10/19/18 for 30 ml with 3 refills      Cholesterol, Total (mg/dL)   Date Value   10/19/2018 181     HDL Cholesterol (mg/dL)

## 2019-06-17 NOTE — TELEPHONE ENCOUNTER
Called pharmacy. They will take pt off automatic refill. Pharmacy was informed that request should be send to Endocrine. Pt was informed, states refills should be coming from endocrine. Pt will let pharmacy know.

## 2019-08-26 RX ORDER — INSULIN ASPART 100 [IU]/ML
INJECTION, SOLUTION INTRAVENOUS; SUBCUTANEOUS
Qty: 30 ML | Refills: 1 | Status: SHIPPED | OUTPATIENT
Start: 2019-08-26 | End: 2020-01-03

## 2019-08-26 NOTE — TELEPHONE ENCOUNTER
Future appt:    Last Appointment with provider:   Visit date not found  Last appointment at [facility] [place]:  Visit date not found  Cholesterol, Total (mg/dL)   Date Value   10/19/2018 181     HDL Cholesterol (mg/dL)   Date Value   10/19/2018 39 (L)     LDL C

## 2019-10-23 ENCOUNTER — OFFICE VISIT (OUTPATIENT)
Dept: FAMILY MEDICINE CLINIC | Facility: CLINIC | Age: 54
End: 2019-10-23
Payer: COMMERCIAL

## 2019-10-23 VITALS
SYSTOLIC BLOOD PRESSURE: 134 MMHG | HEART RATE: 98 BPM | BODY MASS INDEX: 32.82 KG/M2 | HEIGHT: 70.5 IN | WEIGHT: 231.81 LBS | OXYGEN SATURATION: 99 % | RESPIRATION RATE: 16 BRPM | DIASTOLIC BLOOD PRESSURE: 78 MMHG | TEMPERATURE: 98 F

## 2019-10-23 DIAGNOSIS — Z00.00 HEALTH CARE MAINTENANCE: Primary | ICD-10-CM

## 2019-10-23 DIAGNOSIS — I10 ESSENTIAL HYPERTENSION, BENIGN: ICD-10-CM

## 2019-10-23 PROBLEM — N18.30 STAGE 3 CHRONIC KIDNEY DISEASE (HCC): Status: ACTIVE | Noted: 2018-04-02

## 2019-10-23 PROBLEM — R93.1 ELEVATED CORONARY ARTERY CALCIUM SCORE: Status: ACTIVE | Noted: 2019-04-22

## 2019-10-23 PROCEDURE — 99396 PREV VISIT EST AGE 40-64: CPT | Performed by: FAMILY MEDICINE

## 2019-10-23 PROCEDURE — 90686 IIV4 VACC NO PRSV 0.5 ML IM: CPT | Performed by: FAMILY MEDICINE

## 2019-10-23 PROCEDURE — 90472 IMMUNIZATION ADMIN EACH ADD: CPT | Performed by: FAMILY MEDICINE

## 2019-10-23 PROCEDURE — 90732 PPSV23 VACC 2 YRS+ SUBQ/IM: CPT | Performed by: FAMILY MEDICINE

## 2019-10-23 PROCEDURE — 90471 IMMUNIZATION ADMIN: CPT | Performed by: FAMILY MEDICINE

## 2019-10-23 NOTE — PROGRESS NOTES
Chief Complaint:   Patient presents with:  Physical      HPI:   This is a 53year old male coming in for healthcare check. I discussed with patient heart disease secondary prevention.   Discussed cancer early detection including colon surveillance, prost HISTORY  01/12/2018    vaporization of prostate      Family History   Problem Relation Age of Onset   • Hypertension Father    • Thyroid disease Father    • Heart Disorder Father    • Hypertension Sister    • Thyroid disease Sister    • Skin cancer Sister TWICE DAILY, Disp: 180 tablet, Rfl: 1  DILTIAZEM  MG Oral Tab, TAKE 1 TABLET(120 MG) BY MOUTH TWICE DAILY, Disp: 180 tablet, Rfl: 1  CONTOUR NEXT TEST In Vitro Strip, USE TO TEST BLOOD SUGAR FOUR TIMES DAILY AS DIRECTED, Disp: 400 strip, Rfl: 3  Alb breath, wheezing, cough or sputum production. GASTROINTESTINAL:  Denies abdominal pain, nausea, vomiting, constipation, diarrhea    : see HPI   denies dysuria, no urinary frequency , no discharge.     MUSCULOSKELETAL:  Denies weakness, muscle aches,   NE 1. Health care maintenance    - PSA SCREEN; Future  - LIPID PANEL; Future    2. Essential hypertension, benign      Ok for refills as needed. Patient Instructions   Good exam       Obtain flu shot and pneumonia shot tonight.        Obtain fasting

## 2019-10-23 NOTE — PATIENT INSTRUCTIONS
Good exam       Obtain flu shot and pneumonia shot tonight. Obtain fasting labs with dr. Carmelita Madrigal labs. Continue with work on healthy nutrition and staying active. Recheck in 1year.

## 2019-10-24 RX ORDER — DILTIAZEM HYDROCHLORIDE 120 MG/1
TABLET, FILM COATED ORAL
Qty: 180 TABLET | Refills: 3 | Status: SHIPPED | OUTPATIENT
Start: 2019-10-24 | End: 2020-10-05

## 2019-10-24 RX ORDER — GEMFIBROZIL 600 MG/1
TABLET, FILM COATED ORAL
Qty: 180 TABLET | Refills: 3 | Status: SHIPPED | OUTPATIENT
Start: 2019-10-24 | End: 2020-10-05

## 2019-10-24 NOTE — TELEPHONE ENCOUNTER
Future appt:    Last Appointment with provider:   Visit date not found  Last appointment at [facility] [place]:  10/23/2019  Cholesterol, Total (mg/dL)   Date Value   10/19/2018 181     HDL Cholesterol (mg/dL)   Date Value   10/19/2018 39 (L)     LDL Cholesterol

## 2019-11-18 RX ORDER — LOSARTAN POTASSIUM 50 MG/1
TABLET ORAL
Qty: 90 TABLET | Refills: 3 | Status: SHIPPED | OUTPATIENT
Start: 2019-11-18 | End: 2020-11-09

## 2019-11-18 NOTE — TELEPHONE ENCOUNTER
Future appt:    Last Appointment with provider:   10/23/2019 physical; recheck 1 year  Last appointment at [facility]:  10/23/2019  Cholesterol, Total (mg/dL)   Date Value   10/19/2018 181     HDL Cholesterol (mg/dL)   Date Value   10/19/2018 39 (L)

## 2020-01-03 NOTE — TELEPHONE ENCOUNTER
Future appt:    Last Appointment with provider:   10/23/2019 physical; recheck 1 year  Last appointment at [facility]:  10/23/2019  Cholesterol, Total (mg/dL)   Date Value   10/19/2018 181     HDL Cholesterol (mg/dL)   Date Value   10/19/2018 39 (L)

## 2020-09-01 NOTE — TELEPHONE ENCOUNTER
Future appt:    Last Appointment with provider:  10/2019 annual  Last appointment at [facility]:  Visit date not found  Cholesterol, Total (mg/dL)   Date Value   10/19/2018 181     HDL Cholesterol (mg/dL)   Date Value   10/19/2018 39 (L)     LDL Choleste

## 2020-10-03 ENCOUNTER — TELEPHONE (OUTPATIENT)
Dept: FAMILY MEDICINE CLINIC | Facility: CLINIC | Age: 55
End: 2020-10-03

## 2020-10-03 NOTE — TELEPHONE ENCOUNTER
Patient on the nurse schedule 10/5/20 for influenza vaccine and pneumonia vaccine. Noted that patient had Prevnar 13 10/18/18 and pneumovax 23 on 10/23/19. Does patient need another pneumonia vaccine?

## 2020-10-05 ENCOUNTER — NURSE ONLY (OUTPATIENT)
Dept: FAMILY MEDICINE CLINIC | Facility: CLINIC | Age: 55
End: 2020-10-05
Payer: COMMERCIAL

## 2020-10-05 DIAGNOSIS — Z23 NEED FOR VACCINATION: ICD-10-CM

## 2020-10-05 PROCEDURE — 90471 IMMUNIZATION ADMIN: CPT | Performed by: FAMILY MEDICINE

## 2020-10-05 PROCEDURE — 90686 IIV4 VACC NO PRSV 0.5 ML IM: CPT | Performed by: FAMILY MEDICINE

## 2020-10-05 RX ORDER — DILTIAZEM HYDROCHLORIDE 120 MG/1
TABLET, FILM COATED ORAL
Qty: 180 TABLET | Refills: 0 | Status: SHIPPED | OUTPATIENT
Start: 2020-10-05 | End: 2021-01-08

## 2020-10-05 RX ORDER — GEMFIBROZIL 600 MG/1
TABLET, FILM COATED ORAL
Qty: 180 TABLET | Refills: 0 | Status: SHIPPED | OUTPATIENT
Start: 2020-10-05 | End: 2021-01-08

## 2020-10-05 NOTE — TELEPHONE ENCOUNTER
Refills pended for 0 additional refills. Patient due for physical after 10/23/19. Please advise refills. Will inform patient he needs to schedule annual. Booking into December now so 90 day supply pended.        Future appt:    Last Appointment with provide

## 2020-11-10 NOTE — TELEPHONE ENCOUNTER
Chart reviewed     Patient needs to schedule appointment for physical - please call patient to schedule    [facility] for refills after appointment is scheduled

## 2020-11-10 NOTE — TELEPHONE ENCOUNTER
Future appt:    Last Appointment with provider:  10/23/2019    Last appointment at [facility] [place]:  Visit date not found  Cholesterol, Total (mg/dL)   Date Value   10/19/2018 181     HDL Cholesterol (mg/dL)   Date Value   10/19/2018 39 (L)     LDL Cholestero

## 2020-11-13 ENCOUNTER — TELEPHONE (OUTPATIENT)
Dept: FAMILY MEDICINE CLINIC | Facility: CLINIC | Age: 55
End: 2020-11-13

## 2020-11-13 NOTE — TELEPHONE ENCOUNTER
Pt called and made appt with Dr. Laura Sylvester but only wants to see Dr. Jordyn Greco for pre-op. Pt asks to be worked into schedule and will make himself available for anything even short notice.  Please advise

## 2020-11-17 NOTE — TELEPHONE ENCOUNTER
Pre-op visit rescheduled with Dr. Gerber Smith on 11/24/20 per patient request. [facility] [place] 11/27/20 appointment.     Future Appointments   Date Time Provider Akilah uGtiérrez   11/24/2020  2:45 PM Barney Molina MD [facility] [place] [facility]

## 2020-11-17 NOTE — TELEPHONE ENCOUNTER
Future Appointments   Date Time Provider Akilah Gutiérrez   11/27/2020  9:20 AM Anthony Reese MD [facility]     Reviewed Dr. Jinny Zacarias schedule and found an opening on 11/30.  Offered to patient and he declined stating he needed a sooner appoin

## 2020-11-18 RX ORDER — LOSARTAN POTASSIUM 50 MG/1
TABLET ORAL
Qty: 90 TABLET | Refills: 1 | Status: SHIPPED | OUTPATIENT
Start: 2020-11-18

## 2020-11-18 RX ORDER — BLOOD SUGAR DIAGNOSTIC
STRIP MISCELLANEOUS
Qty: 400 STRIP | Refills: 3 | Status: SHIPPED | OUTPATIENT
Start: 2020-11-18

## 2020-11-18 NOTE — TELEPHONE ENCOUNTER
Pt returned phone call and annual physical appointment was made for 1/26/2020 at 8 am. Pt also requested that test strips also be refilled.  Please advise on refills now that appointments are made

## 2020-11-18 NOTE — TELEPHONE ENCOUNTER
Left message for pt to call back in regards to refill requests received.  Pt needs to be scheduled for annual physical

## 2020-11-24 ENCOUNTER — APPOINTMENT (OUTPATIENT)
Dept: LAB | Age: 55
End: 2020-11-24
Attending: FAMILY MEDICINE
Payer: COMMERCIAL

## 2020-11-24 ENCOUNTER — OFFICE VISIT (OUTPATIENT)
Dept: FAMILY MEDICINE CLINIC | Facility: CLINIC | Age: 55
End: 2020-11-24
Payer: COMMERCIAL

## 2020-11-24 VITALS
RESPIRATION RATE: 16 BRPM | SYSTOLIC BLOOD PRESSURE: 118 MMHG | WEIGHT: 224 LBS | OXYGEN SATURATION: 98 % | BODY MASS INDEX: 31.71 KG/M2 | TEMPERATURE: 97 F | HEART RATE: 90 BPM | HEIGHT: 70.5 IN | DIASTOLIC BLOOD PRESSURE: 66 MMHG

## 2020-11-24 DIAGNOSIS — Z01.818 PREOPERATIVE EXAMINATION: Primary | ICD-10-CM

## 2020-11-24 DIAGNOSIS — Z01.818 PREOP TESTING: ICD-10-CM

## 2020-11-24 PROCEDURE — 3078F DIAST BP <80 MM HG: CPT | Performed by: FAMILY MEDICINE

## 2020-11-24 PROCEDURE — 81003 URINALYSIS AUTO W/O SCOPE: CPT | Performed by: UROLOGY

## 2020-11-24 PROCEDURE — 99243 OFF/OP CNSLTJ NEW/EST LOW 30: CPT | Performed by: FAMILY MEDICINE

## 2020-11-24 PROCEDURE — 3008F BODY MASS INDEX DOCD: CPT | Performed by: FAMILY MEDICINE

## 2020-11-24 PROCEDURE — 3074F SYST BP LT 130 MM HG: CPT | Performed by: FAMILY MEDICINE

## 2020-11-24 PROCEDURE — 99072 ADDL SUPL MATRL&STAF TM PHE: CPT | Performed by: FAMILY MEDICINE

## 2020-11-24 RX ORDER — TAMSULOSIN HYDROCHLORIDE 0.4 MG/1
0.4 CAPSULE ORAL DAILY
COMMUNITY
Start: 2020-03-11 | End: 2020-12-21

## 2020-11-24 RX ORDER — ATORVASTATIN CALCIUM 10 MG/1
10 TABLET, FILM COATED ORAL NIGHTLY
COMMUNITY
Start: 2020-05-27 | End: 2020-11-24 | Stop reason: CLARIF

## 2020-11-24 RX ORDER — FINASTERIDE 5 MG/1
5 TABLET, FILM COATED ORAL DAILY
COMMUNITY
Start: 2020-03-20 | End: 2020-12-21

## 2020-11-24 RX ORDER — KETOCONAZOLE 20 MG/ML
1 SHAMPOO TOPICAL DAILY
COMMUNITY
Start: 2019-12-19

## 2020-11-24 RX ORDER — DOXYCYCLINE HYCLATE 20 MG
20 TABLET ORAL 2 TIMES DAILY
COMMUNITY
Start: 2020-10-27 | End: 2021-03-08 | Stop reason: ALTCHOICE

## 2020-11-24 RX ORDER — ATORVASTATIN CALCIUM 10 MG/1
10 TABLET, FILM COATED ORAL NIGHTLY
COMMUNITY

## 2020-11-29 PROBLEM — I25.119 ATHEROSCLEROSIS OF NATIVE CORONARY ARTERY OF NATIVE HEART WITH ANGINA PECTORIS (HCC): Status: ACTIVE | Noted: 2020-05-15

## 2020-11-29 PROBLEM — G89.29 CHRONIC RIGHT-SIDED LOW BACK PAIN WITHOUT SCIATICA: Status: ACTIVE | Noted: 2019-12-27

## 2020-11-29 PROBLEM — M41.50 SCOLIOSIS DUE TO DEGENERATIVE DISEASE OF SPINE IN ADULT PATIENT: Status: ACTIVE | Noted: 2019-12-27

## 2020-11-29 PROBLEM — M41.80 SCOLIOSIS DUE TO DEGENERATIVE DISEASE OF SPINE IN ADULT PATIENT: Status: ACTIVE | Noted: 2019-12-27

## 2020-11-29 PROBLEM — S39.012A LUMBAR STRAIN, INITIAL ENCOUNTER: Status: ACTIVE | Noted: 2019-12-27

## 2020-11-29 PROBLEM — Z98.61 POSTSURGICAL PERCUTANEOUS TRANSLUMINAL CORONARY ANGIOPLASTY STATUS: Status: ACTIVE | Noted: 2020-06-02

## 2020-11-29 PROBLEM — M54.50 CHRONIC RIGHT-SIDED LOW BACK PAIN WITHOUT SCIATICA: Status: ACTIVE | Noted: 2019-12-27

## 2020-11-30 NOTE — PROGRESS NOTES
Chief Complaint:   Patient presents with:  Pre-Op Exam: Robotic Prostatectomy 12/4/2020 Dr. Martita Carias      HPI:   This is a 63year old male coming in for preoperative examination  Scheduled for prostatectomy with dr. Martita Carias.      History of CAD ,  History of a borderline BP      DM 8/2014      atrial fib-resolved 2014      renal cell ca 6/2016            Surgical History (reviewed - no changes required):       arthroscopy right knee 2007      right nephrectomy 6/2016                  Social History (reviewed - n (ACCU-CHEK EVERTON SMARTVIEW) w/Device Does not apply Kit      • Glucose Blood (ACCU-CHEK SMARTVIEW) In Vitro Strip      • Insulin Pen Needle (CLICKFINE PEN NEEDLES) 31G X 6 MM Does not apply Misc      • ACCU-CHEK FASTCLIX LANCETS Does not apply Misc      • B icterus, conjunctivae clear bilaterally, no eye discharge   Ears: External normal. Nose: patent, no nasal discharge   Throat:  No tonsillar erythema or exudate. Mouth:  No oral lesions or ulcerations, good dentition.     NECK: Supple,  no JVD, no thyrome right kidney ([facility] [address]     Incisional hernia     Renal cell carcinoma (HCC)     Melanoma in situ of face ([facility] [address]     Stage 3 chronic kidney disease     Essential hypertension, benign     Elevated coronary artery calcium score      Ahmet Saavedra MD  11/29/2020

## 2020-12-10 ENCOUNTER — TELEPHONE (OUTPATIENT)
Dept: FAMILY MEDICINE CLINIC | Facility: CLINIC | Age: 55
End: 2020-12-10

## 2020-12-10 NOTE — TELEPHONE ENCOUNTER
Pt had surgery on 12/4/20 and the  called to inform us that pt should probably be seen before his appt scheduled in January. They also wanted to let you know pt will be doing HH with [facility]. Please advise if a sooner appt is needed for post op.

## 2020-12-11 NOTE — TELEPHONE ENCOUNTER
Beatris Freshwater informed of the below. She asked that I let patient know as well. Patient informed of the below. He will await a call once an appt is available.

## 2020-12-11 NOTE — TELEPHONE ENCOUNTER
I can put him on \"Wait List\" for possible appointment next week, depending on when I return to [place].

## 2020-12-21 ENCOUNTER — OFFICE VISIT (OUTPATIENT)
Dept: FAMILY MEDICINE CLINIC | Facility: CLINIC | Age: 55
End: 2020-12-21
Payer: COMMERCIAL

## 2020-12-21 VITALS
TEMPERATURE: 98 F | WEIGHT: 210.63 LBS | SYSTOLIC BLOOD PRESSURE: 120 MMHG | HEART RATE: 102 BPM | HEIGHT: 70.5 IN | DIASTOLIC BLOOD PRESSURE: 78 MMHG | OXYGEN SATURATION: 99 % | BODY MASS INDEX: 29.82 KG/M2 | RESPIRATION RATE: 16 BRPM

## 2020-12-21 DIAGNOSIS — S36.60XD: ICD-10-CM

## 2020-12-21 DIAGNOSIS — N99.81 INTRAOPERATIVE BLADDER INJURY: ICD-10-CM

## 2020-12-21 DIAGNOSIS — N40.1 BENIGN PROSTATIC HYPERPLASIA WITH LOWER URINARY TRACT SYMPTOMS, SYMPTOM DETAILS UNSPECIFIED: Primary | ICD-10-CM

## 2020-12-21 DIAGNOSIS — D62 POSTOPERATIVE ANEMIA DUE TO ACUTE BLOOD LOSS: ICD-10-CM

## 2020-12-21 PROBLEM — S36.60XA RECTUM INJURY: Status: ACTIVE | Noted: 2020-12-21

## 2020-12-21 PROCEDURE — 3008F BODY MASS INDEX DOCD: CPT | Performed by: FAMILY MEDICINE

## 2020-12-21 PROCEDURE — 99214 OFFICE O/P EST MOD 30 MIN: CPT | Performed by: FAMILY MEDICINE

## 2020-12-21 PROCEDURE — 3074F SYST BP LT 130 MM HG: CPT | Performed by: FAMILY MEDICINE

## 2020-12-21 PROCEDURE — 3078F DIAST BP <80 MM HG: CPT | Performed by: FAMILY MEDICINE

## 2020-12-21 RX ORDER — ASCORBIC ACID 500 MG
TABLET ORAL
COMMUNITY
End: 2021-05-17

## 2020-12-21 NOTE — PATIENT INSTRUCTIONS
Continue with using incentive spirometer       Plan for recheck of labs at 4 - 6 weeks after surgery

## 2020-12-21 NOTE — TELEPHONE ENCOUNTER
Pt contacted and scheduled for post op visit.      Future Appointments   Date Time Provider Akilah Gutiérrez   12/21/2020  4:30 PM Barney Molina MD [facility] [place]   1/26/2021  8:00 AM Barney Molina MD [facility] [place]

## 2020-12-21 NOTE — PROGRESS NOTES
Chief Complaint:   Patient presents with:  Post-Op: Pt here for postop appointment after many procedures.  Pt states that this happened on 12/4      HPI:   This is a 63year old male coming in for post hospital f/u   Patient at [facility] with prostatectomy c/b • Skin cancer Sister    • Hypertension Brother    • Diabetes Brother    • Heart Disorder Brother    • Breast Cancer Maternal Grandmother    • Cancer Maternal Grandmother         breast ca   • Heart Disease Paternal Grandfather    • Stroke Paternal Jessica Goldsmith TABLET(50 MG) BY MOUTH DAILY 90 tablet 1   • Glucose Blood (CONTOUR NEXT TEST) In Vitro Strip USE TO TEST BLOOD SUGAR FOUR TIMES DAILY AS DIRECTED 400 strip 3   • DILTIAZEM  MG Oral Tab TAKE 1 TABLET(120 MG) BY MOUTH TWICE DAILY 180 tablet 0   • GEM muscle aches,   NEUROLOGICAL:  Denies headache, dizziness,     HEMATOLOGIC:  Denies bleeding or bruising. PSYCHIATRIC:  Denies depression or anxiety.   ENDOCRINOLOGIC:  Denies cold or heat intolerance,   ALLERGIES:  Denies allergic response,    EXAM:   BP with using incentive spirometer       Plan for recheck of labs at 4 - 6 weeks after surgery           Patient/Caregiver Education: Patient/Caregiver Education: There are no barriers to learning. Medical education done.    Outcome: Patient verbalizes underst

## 2020-12-28 ENCOUNTER — PATIENT MESSAGE (OUTPATIENT)
Dept: FAMILY MEDICINE CLINIC | Facility: CLINIC | Age: 55
End: 2020-12-28

## 2020-12-29 NOTE — TELEPHONE ENCOUNTER
From: Denise Loja  To: Brian Parmar MD  Sent: 12/28/2020 4:06 PM CST  Subject: Visit Follow-up Question    Hi Dr Jessica Elliott     My cough doesn't seem to be improving and feels somewhat asthmatic in nature. Do you think an inhaler would be helpful?  I

## 2020-12-30 RX ORDER — ALBUTEROL SULFATE 90 UG/1
2 AEROSOL, METERED RESPIRATORY (INHALATION) EVERY 6 HOURS PRN
Qty: 1 INHALER | Refills: 3 | Status: SHIPPED | OUTPATIENT
Start: 2020-12-30 | End: 2021-05-17

## 2020-12-30 NOTE — TELEPHONE ENCOUNTER
Chart reviewed     Ok for inhaler to use  - take roughly 30 minutes prior to use of incentive spirometer.      Albuterol script sent in

## 2021-01-08 RX ORDER — GEMFIBROZIL 600 MG/1
TABLET, FILM COATED ORAL
Qty: 180 TABLET | Refills: 0 | Status: SHIPPED | OUTPATIENT
Start: 2021-01-08 | End: 2021-04-14

## 2021-01-08 RX ORDER — DILTIAZEM HYDROCHLORIDE 120 MG/1
TABLET, FILM COATED ORAL
Qty: 180 TABLET | Refills: 0 | Status: SHIPPED | OUTPATIENT
Start: 2021-01-08

## 2021-01-08 NOTE — TELEPHONE ENCOUNTER
Future appt:     Your appointments     Date & Time Appointment Department [facility])    Jan 26, 2021  8:00 AM CST Physical - Established with Obinna Morales MD [address] [place] [address] ([facility])            Mauro Bowden

## 2021-01-11 ENCOUNTER — TELEPHONE (OUTPATIENT)
Dept: FAMILY MEDICINE CLINIC | Facility: CLINIC | Age: 56
End: 2021-01-11

## 2021-01-11 DIAGNOSIS — I10 ESSENTIAL HYPERTENSION, BENIGN: ICD-10-CM

## 2021-01-11 DIAGNOSIS — Z00.00 HEALTH CARE MAINTENANCE: Primary | ICD-10-CM

## 2021-01-11 DIAGNOSIS — E78.00 PURE HYPERCHOLESTEROLEMIA: ICD-10-CM

## 2021-01-11 NOTE — TELEPHONE ENCOUNTER
Patient is coming in for fasting BW prior to his annual physical with Tr. Please place lab orders.      Future Appointments   Date Time Provider Akilah Gutiérrez   1/16/2021  9:15 AM [facility]   1/26/2021  8:00 AM Eliseo Patrick

## 2021-01-11 NOTE — TELEPHONE ENCOUNTER
Please put blood work orders in chart - Coming 1/16/21 for fasting Blood Work .   Has appointment with  on 1/26/21

## 2021-01-21 ENCOUNTER — TELEPHONE (OUTPATIENT)
Dept: FAMILY MEDICINE CLINIC | Facility: CLINIC | Age: 56
End: 2021-01-21

## 2021-01-21 NOTE — TELEPHONE ENCOUNTER
when making him an appointment for lab tomorrow he answered yes to coming in contact with someone positive for COVID, no to all other questions

## 2021-01-21 NOTE — TELEPHONE ENCOUNTER
Patient has upcoming in office appointments.    Future Appointments   Date Time Provider Akilah Marla   1/22/2021 10:45 AM [facility]   1/26/2021  8:00 AM Leonila Morfin MD [facility] [place]     Patient was in contact w

## 2021-01-21 NOTE — TELEPHONE ENCOUNTER
According to CDC, Yes. You should still self-quarantine for 14 days since your last exposure. It can take up to 14 days after exposure to the virus for a person to develop COVID-19 symptoms.  A negative result before end of the 14-day quarantine period does

## 2021-01-21 NOTE — TELEPHONE ENCOUNTER
Discussed with patient. He cancelled lab appt and r/s physical.     Future Appointments   Date Time Provider Akilah Gutiérrez   3/8/2021  4:00 PM Maria Luz Levine MD [facility] [place]     Dr. Zulay Saleh please review labs drawn at [facility] on 1/15/21.

## 2021-02-26 ENCOUNTER — LABORATORY ENCOUNTER (OUTPATIENT)
Dept: LAB | Age: 56
End: 2021-02-26
Attending: FAMILY MEDICINE
Payer: COMMERCIAL

## 2021-02-26 DIAGNOSIS — Z00.00 HEALTH CARE MAINTENANCE: ICD-10-CM

## 2021-02-26 DIAGNOSIS — E78.00 PURE HYPERCHOLESTEROLEMIA: ICD-10-CM

## 2021-02-26 LAB
ALBUMIN SERPL-MCNC: 3.9 G/DL (ref 3.4–5)
ALBUMIN/GLOB SERPL: 1 {RATIO} (ref 1–2)
ALP LIVER SERPL-CCNC: 83 U/L
ALT SERPL-CCNC: 18 U/L
ANION GAP SERPL CALC-SCNC: 5 MMOL/L (ref 0–18)
AST SERPL-CCNC: 9 U/L (ref 15–37)
BASOPHILS # BLD AUTO: 0.09 X10(3) UL (ref 0–0.2)
BASOPHILS NFR BLD AUTO: 1.4 %
BILIRUB SERPL-MCNC: 0.4 MG/DL (ref 0.1–2)
BUN BLD-MCNC: 18 MG/DL (ref 7–18)
BUN/CREAT SERPL: 14.1 (ref 10–20)
CALCIUM BLD-MCNC: 9.2 MG/DL (ref 8.5–10.1)
CHLORIDE SERPL-SCNC: 110 MMOL/L (ref 98–112)
CHOLEST SMN-MCNC: 155 MG/DL (ref ?–200)
CO2 SERPL-SCNC: 26 MMOL/L (ref 21–32)
CREAT BLD-MCNC: 1.28 MG/DL
DEPRECATED RDW RBC AUTO: 42.2 FL (ref 35.1–46.3)
EOSINOPHIL # BLD AUTO: 0.36 X10(3) UL (ref 0–0.7)
EOSINOPHIL NFR BLD AUTO: 5.7 %
ERYTHROCYTE [DISTWIDTH] IN BLOOD BY AUTOMATED COUNT: 13.2 % (ref 11–15)
GLOBULIN PLAS-MCNC: 3.8 G/DL (ref 2.8–4.4)
GLUCOSE BLD-MCNC: 106 MG/DL (ref 70–99)
HCT VFR BLD AUTO: 37.9 %
HDLC SERPL-MCNC: 47 MG/DL (ref 40–59)
HGB BLD-MCNC: 12.5 G/DL
IMM GRANULOCYTES # BLD AUTO: 0.02 X10(3) UL (ref 0–1)
IMM GRANULOCYTES NFR BLD: 0.3 %
LDLC SERPL CALC-MCNC: 95 MG/DL (ref ?–100)
LYMPHOCYTES # BLD AUTO: 1.43 X10(3) UL (ref 1–4)
LYMPHOCYTES NFR BLD AUTO: 22.6 %
M PROTEIN MFR SERPL ELPH: 7.7 G/DL (ref 6.4–8.2)
MCH RBC QN AUTO: 28.7 PG (ref 26–34)
MCHC RBC AUTO-ENTMCNC: 33 G/DL (ref 31–37)
MCV RBC AUTO: 87.1 FL
MONOCYTES # BLD AUTO: 0.49 X10(3) UL (ref 0.1–1)
MONOCYTES NFR BLD AUTO: 7.8 %
NEUTROPHILS # BLD AUTO: 3.93 X10 (3) UL (ref 1.5–7.7)
NEUTROPHILS # BLD AUTO: 3.93 X10(3) UL (ref 1.5–7.7)
NEUTROPHILS NFR BLD AUTO: 62.2 %
NONHDLC SERPL-MCNC: 108 MG/DL (ref ?–130)
OSMOLALITY SERPL CALC.SUM OF ELEC: 294 MOSM/KG (ref 275–295)
PATIENT FASTING Y/N/NP: YES
PATIENT FASTING Y/N/NP: YES
PLATELET # BLD AUTO: 259 10(3)UL (ref 150–450)
POTASSIUM SERPL-SCNC: 4 MMOL/L (ref 3.5–5.1)
RBC # BLD AUTO: 4.35 X10(6)UL
SODIUM SERPL-SCNC: 141 MMOL/L (ref 136–145)
TRIGL SERPL-MCNC: 63 MG/DL (ref 30–149)
VLDLC SERPL CALC-MCNC: 13 MG/DL (ref 0–30)
WBC # BLD AUTO: 6.3 X10(3) UL (ref 4–11)

## 2021-02-26 PROCEDURE — 80061 LIPID PANEL: CPT | Performed by: FAMILY MEDICINE

## 2021-02-26 PROCEDURE — 80053 COMPREHEN METABOLIC PANEL: CPT | Performed by: FAMILY MEDICINE

## 2021-02-26 PROCEDURE — 36415 COLL VENOUS BLD VENIPUNCTURE: CPT | Performed by: FAMILY MEDICINE

## 2021-02-26 PROCEDURE — 85025 COMPLETE CBC W/AUTO DIFF WBC: CPT | Performed by: FAMILY MEDICINE

## 2021-03-08 ENCOUNTER — TELEPHONE (OUTPATIENT)
Dept: FAMILY MEDICINE CLINIC | Facility: CLINIC | Age: 56
End: 2021-03-08

## 2021-03-08 ENCOUNTER — OFFICE VISIT (OUTPATIENT)
Dept: FAMILY MEDICINE CLINIC | Facility: CLINIC | Age: 56
End: 2021-03-08
Payer: COMMERCIAL

## 2021-03-08 VITALS
RESPIRATION RATE: 12 BRPM | TEMPERATURE: 98 F | WEIGHT: 220 LBS | BODY MASS INDEX: 30.46 KG/M2 | HEIGHT: 71.25 IN | DIASTOLIC BLOOD PRESSURE: 80 MMHG | HEART RATE: 85 BPM | OXYGEN SATURATION: 98 % | SYSTOLIC BLOOD PRESSURE: 128 MMHG

## 2021-03-08 DIAGNOSIS — I10 ESSENTIAL HYPERTENSION, BENIGN: ICD-10-CM

## 2021-03-08 DIAGNOSIS — Z00.00 HEALTH CARE MAINTENANCE: Primary | ICD-10-CM

## 2021-03-08 PROBLEM — N32.1 RECTOVESICAL FISTULA: Status: ACTIVE | Noted: 2021-01-07

## 2021-03-08 PROCEDURE — 99396 PREV VISIT EST AGE 40-64: CPT | Performed by: FAMILY MEDICINE

## 2021-03-08 PROCEDURE — 3079F DIAST BP 80-89 MM HG: CPT | Performed by: FAMILY MEDICINE

## 2021-03-08 PROCEDURE — 3008F BODY MASS INDEX DOCD: CPT | Performed by: FAMILY MEDICINE

## 2021-03-08 PROCEDURE — 3074F SYST BP LT 130 MM HG: CPT | Performed by: FAMILY MEDICINE

## 2021-03-08 NOTE — PROGRESS NOTES
Chief Complaint:   Patient presents with:  Physical      HPI:   This is a 61year old male coming in for health check   A lot of stresses with operation prostatectomy with complications.          Patient has chronic medical conditions that were reviewed. pg    MCHC 33.0 31.0 - 37.0 g/dL    RDW 13.2 11.0 - 15.0 %    RDW-SD 42.2 35.1 - 46.3 fL    Neutrophil Absolute Prelim 3.93 1.50 - 7.70 x10 (3) uL    Neutrophil Absolute 3.93 1.50 - 7.70 x10(3) uL    Lymphocyte Absolute 1.43 1.00 - 4.00 x10(3) uL    Monocy Relation Age of Onset   • Hypertension Father    • Thyroid disease Father    • Heart Disorder Father    • Hypertension Sister    • Thyroid disease Sister    • Skin cancer Sister    • Hypertension Brother    • Diabetes Brother    • Heart Disorder Brother Feeling of Stress :   Social Connections:       Frequency of Communication with Friends and Family:       Frequency of Social Gatherings with Friends and Family:       Attends Mosque Services:       Active Member of Clubs or Organizations:       Attends • Glucose Blood (ACCU-CHEK SMARTVIEW) In Vitro Strip      • Insulin Pen Needle (CLICKFINE PEN NEEDLES) 31G X 6 MM Does not apply Misc      • ACCU-CHEK FASTCLIX LANCETS Does not apply Misc      • BD PEN NEEDLE EVERTON U/F 32G X 4 MM Does not apply [employer] [facility] age, well groomed. Physical Exam:    GEN:  Patient is alert, awake and oriented, well developed, well nourished  Male    , no apparent distress.   HEENT:  Head:  Normocephalic, atraumatic     Eyes: EOMI, PERRLA, no scleral icterus, conjunctivae clear shira Atherosclerosis of native coronary artery of native heart with angina pectoris (HCC)     Chronic right-sided low back pain without sciatica     Lumbar strain, initial encounter     Postsurgical percutaneous transluminal coronary angioplasty status     Sc

## 2021-03-08 NOTE — TELEPHONE ENCOUNTER
Pt submitted a signed authorization to use and disclose health information form to have his records from [id number] sent to himself, this was sent to [email or web address]. Patient is requesting that the records be emailed to him at [email or web address]

## 2021-03-14 PROBLEM — R93.1 ELEVATED CORONARY ARTERY CALCIUM SCORE: Status: RESOLVED | Noted: 2019-04-22 | Resolved: 2021-03-14

## 2021-03-17 DIAGNOSIS — Z23 NEED FOR VACCINATION: ICD-10-CM

## 2021-04-06 ENCOUNTER — TELEPHONE (OUTPATIENT)
Dept: FAMILY MEDICINE CLINIC | Facility: CLINIC | Age: 56
End: 2021-04-06

## 2021-04-06 NOTE — TELEPHONE ENCOUNTER
Meds were changed at [facility]   and he needs to touch base with Dr Rohan Carbone prior to proceeding on new RX regime      please call him back       No future appointments.

## 2021-04-06 NOTE — TELEPHONE ENCOUNTER
Patient states he recently had surgery, about 1 week ago (3/29/21) for ROBOTIC REPAIR OF RECTO-URETHRAL FISTULA, COLOSTOMY CLOSURE, ILEOSTOMY, LYSIS OF ADHESIONS done at [facility]. Patient states that he has hx of elevated heart rate after surgery.   State

## 2021-04-07 RX ORDER — DILTIAZEM HYDROCHLORIDE 120 MG/1
120 CAPSULE, EXTENDED RELEASE ORAL 2 TIMES DAILY
COMMUNITY
Start: 2021-04-01 | End: 2021-04-07

## 2021-04-07 NOTE — TELEPHONE ENCOUNTER
Chart reviewed     Ok to continue with Extended Release Diltiazem    Recommend f/u appointment in 4 -6 weeks.

## 2021-04-08 RX ORDER — DILTIAZEM HYDROCHLORIDE 120 MG/1
120 CAPSULE, EXTENDED RELEASE ORAL 2 TIMES DAILY
Qty: 180 CAPSULE | Refills: 3 | Status: SHIPPED | OUTPATIENT
Start: 2021-04-08 | End: 2021-05-17

## 2021-04-08 NOTE — TELEPHONE ENCOUNTER
Patient informed of the below and v/u. Patient schedule f/u appointment with me and would like Dr. Surinder Carbone to send in a refill of the diltiazem to [facility] in [place]. Patient states he can get 90 day supplies there. Please advise.      Future Appointment

## 2021-04-14 RX ORDER — GEMFIBROZIL 600 MG/1
TABLET, FILM COATED ORAL
Qty: 180 TABLET | Refills: 0 | Status: SHIPPED | OUTPATIENT
Start: 2021-04-14 | End: 2021-07-12

## 2021-04-14 NOTE — TELEPHONE ENCOUNTER
Future appt:     Your appointments     Date & Time Appointment Department [facility])    May 17, 2021  4:15 PM CDT Follow Up Visit with Lorenzo Goldstein MD [facility]            [address] [place] [address]

## 2021-05-17 ENCOUNTER — OFFICE VISIT (OUTPATIENT)
Dept: FAMILY MEDICINE CLINIC | Facility: CLINIC | Age: 56
End: 2021-05-17
Payer: COMMERCIAL

## 2021-05-17 VITALS
HEIGHT: 71.25 IN | RESPIRATION RATE: 18 BRPM | DIASTOLIC BLOOD PRESSURE: 80 MMHG | WEIGHT: 210 LBS | BODY MASS INDEX: 29.07 KG/M2 | TEMPERATURE: 99 F | HEART RATE: 95 BPM | OXYGEN SATURATION: 98 % | SYSTOLIC BLOOD PRESSURE: 128 MMHG

## 2021-05-17 DIAGNOSIS — N18.31 STAGE 3A CHRONIC KIDNEY DISEASE (HCC): ICD-10-CM

## 2021-05-17 DIAGNOSIS — I10 ESSENTIAL HYPERTENSION, BENIGN: Primary | ICD-10-CM

## 2021-05-17 DIAGNOSIS — S36.60XD: ICD-10-CM

## 2021-05-17 PROCEDURE — 3008F BODY MASS INDEX DOCD: CPT | Performed by: FAMILY MEDICINE

## 2021-05-17 PROCEDURE — 3074F SYST BP LT 130 MM HG: CPT | Performed by: FAMILY MEDICINE

## 2021-05-17 PROCEDURE — 99214 OFFICE O/P EST MOD 30 MIN: CPT | Performed by: FAMILY MEDICINE

## 2021-05-17 PROCEDURE — 3079F DIAST BP 80-89 MM HG: CPT | Performed by: FAMILY MEDICINE

## 2021-05-17 RX ORDER — LOPERAMIDE HYDROCHLORIDE 2 MG/1
2 CAPSULE ORAL 4 TIMES DAILY PRN
Qty: 120 CAPSULE | Refills: 5 | Status: SHIPPED | OUTPATIENT
Start: 2021-05-17

## 2021-05-17 RX ORDER — DULAGLUTIDE 0.75 MG/.5ML
1 INJECTION, SOLUTION SUBCUTANEOUS
COMMUNITY
Start: 2020-12-01 | End: 2021-11-19

## 2021-05-17 RX ORDER — OXYBUTYNIN CHLORIDE 5 MG/1
5 TABLET ORAL 3 TIMES DAILY
COMMUNITY
Start: 2021-04-01

## 2021-05-17 RX ORDER — DOXYCYCLINE HYCLATE 20 MG
20 TABLET ORAL 2 TIMES DAILY
COMMUNITY
Start: 2021-05-11

## 2021-05-17 RX ORDER — DILTIAZEM HYDROCHLORIDE 120 MG/1
120 CAPSULE, COATED, EXTENDED RELEASE ORAL 2 TIMES DAILY
COMMUNITY
Start: 2021-04-01

## 2021-05-17 RX ORDER — FLASH GLUCOSE SCANNING READER
1 EACH MISCELLANEOUS DAILY
COMMUNITY
Start: 2020-06-10

## 2021-05-17 RX ORDER — FLASH GLUCOSE SENSOR
1 KIT MISCELLANEOUS
COMMUNITY
Start: 2020-10-06

## 2021-05-17 RX ORDER — CLINDAMYCIN PHOSPHATE 11.9 MG/ML
1 SOLUTION TOPICAL
COMMUNITY
Start: 2021-03-15

## 2021-05-17 RX ORDER — LOPERAMIDE HYDROCHLORIDE 2 MG/1
2 CAPSULE ORAL
COMMUNITY

## 2021-05-18 NOTE — PROGRESS NOTES
Chief Complaint:   Patient presents with:   Follow - Up      HPI:   This is a 61year old male coming in for post hospital follow up   Recently with revision of colectomy   Has been feeling better   Able to now play golf           Results for orders placed x10(3) uL    Immature Granulocyte Absolute 0.02 0.00 - 1.00 x10(3) uL    Neutrophil % 62.2 %    Lymphocyte % 22.6 %    Monocyte % 7.8 %    Eosinophil % 5.7 %    Basophil % 1.4 %    Immature Granulocyte % 0.3 %       HISTORY:  Past Medical History:   Veronica Crowley Grandfather    • Stroke Paternal Grandfather    • Heart Disorder Paternal Grandfather    • Heart Disorder Maternal Grandfather    • Heart Disorder Paternal Grandmother           Social History    Socioeconomic History      Marital status:       Spou (IMODIUM A-D) 2 MG Oral Cap Take 1 capsule (2 mg total) by mouth 4 (four) times daily as needed for Diarrhea.  120 capsule 5   • GEMFIBROZIL 600 MG Oral Tab TAKE 1 TABLET(600 MG) BY MOUTH TWICE DAILY 180 tablet 0   • DILTIAZEM  MG Oral Tab TAKE 1 TAB discomfort, palpitations, edema,  RESPIRATORY:  Denies shortness of breath, wheezing, cough or sputum production.   GASTROINTESTINAL:  Denies abdominal pain, nausea, vomiting, constipation, diarrhea    : denies dysuria, no urinary frequency , no discharge subsequent encounter      3.  Stage 3a chronic kidney disease (HCC)  Stable           Meds & Refills for this Visit:  Requested Prescriptions     Signed Prescriptions Disp Refills   • Loperamide HCl (IMODIUM A-D) 2 MG Oral Cap 120 capsule 5     Sig: Take 1

## 2021-07-12 NOTE — TELEPHONE ENCOUNTER
Future appt:     Your appointments     Date & Time Appointment Department [facility])    Nov 17, 2021  8:00 AM CST Follow Up Visit with Jaycee Brewer MD [address], [place] ([facility])            [address] [place] [address]

## 2021-07-13 RX ORDER — GEMFIBROZIL 600 MG/1
TABLET, FILM COATED ORAL
Qty: 180 TABLET | Refills: 1 | Status: SHIPPED | OUTPATIENT
Start: 2021-07-13 | End: 2021-10-25

## 2021-10-25 RX ORDER — GEMFIBROZIL 600 MG/1
TABLET, FILM COATED ORAL
Qty: 180 TABLET | Refills: 2 | Status: SHIPPED | OUTPATIENT
Start: 2021-10-25 | End: 2022-09-30

## 2021-10-25 NOTE — TELEPHONE ENCOUNTER
Future appt: Your appointments     Date & Time Appointment Department [facility])    Nov 17, 2021  8:00 AM CST Follow Up Visit with Gabriel Arias MD [address] [facility] ([facility])            25 [facility], [address] [place] [address] [place]  V[address] [place] [address]  [phone]        Last Appointment with provider:   5/17/2021 for HTN follow up. [place] [facility] [place]:  5/17/2021  Cholesterol, Total (mg/dL)   Date Value   02/26/2021 155     HDL Cholesterol (mg/dL)   Date Value   02/26/2021 47     LDL Cholesterol (mg/dL)   Date Value   02/26/2021 95     Triglycerides (mg/dL)   Date Value   02/26/2021 63     Lab Results   Component Value Date    A1C 6.1 10/07/2019     No results found for: T4F, TSH, TSHT4    No follow-ups on file.

## 2021-11-19 ENCOUNTER — OFFICE VISIT (OUTPATIENT)
Dept: FAMILY MEDICINE CLINIC | Facility: CLINIC | Age: 56
End: 2021-11-19
Payer: COMMERCIAL

## 2021-11-19 VITALS
RESPIRATION RATE: 18 BRPM | HEART RATE: 70 BPM | TEMPERATURE: 98 F | HEIGHT: 70.5 IN | WEIGHT: 226.19 LBS | OXYGEN SATURATION: 97 % | BODY MASS INDEX: 32.02 KG/M2 | DIASTOLIC BLOOD PRESSURE: 76 MMHG | SYSTOLIC BLOOD PRESSURE: 126 MMHG

## 2021-11-19 DIAGNOSIS — E11.29 TYPE 2 DIABETES MELLITUS WITH MICROALBUMINURIA, WITH LONG-TERM CURRENT USE OF INSULIN (HCC): ICD-10-CM

## 2021-11-19 DIAGNOSIS — I48.0 PAROXYSMAL ATRIAL FIBRILLATION (HCC): ICD-10-CM

## 2021-11-19 DIAGNOSIS — I10 ESSENTIAL HYPERTENSION, BENIGN: Primary | ICD-10-CM

## 2021-11-19 DIAGNOSIS — E78.49 FAMILIAL MULTIPLE LIPOPROTEIN-TYPE HYPERLIPIDEMIA: ICD-10-CM

## 2021-11-19 DIAGNOSIS — R80.9 TYPE 2 DIABETES MELLITUS WITH MICROALBUMINURIA, WITH LONG-TERM CURRENT USE OF INSULIN (HCC): ICD-10-CM

## 2021-11-19 DIAGNOSIS — Z79.4 TYPE 2 DIABETES MELLITUS WITH MICROALBUMINURIA, WITH LONG-TERM CURRENT USE OF INSULIN (HCC): ICD-10-CM

## 2021-11-19 PROCEDURE — 3008F BODY MASS INDEX DOCD: CPT | Performed by: FAMILY MEDICINE

## 2021-11-19 PROCEDURE — 3078F DIAST BP <80 MM HG: CPT | Performed by: FAMILY MEDICINE

## 2021-11-19 PROCEDURE — 99214 OFFICE O/P EST MOD 30 MIN: CPT | Performed by: FAMILY MEDICINE

## 2021-11-19 PROCEDURE — 3074F SYST BP LT 130 MM HG: CPT | Performed by: FAMILY MEDICINE

## 2021-11-19 RX ORDER — ALUMINUM ZIRCONIUM OCTACHLOROHYDREX GLY 16 G/100G
3 GEL TOPICAL AS NEEDED
COMMUNITY
Start: 2021-08-12

## 2021-11-19 RX ORDER — DULAGLUTIDE 1.5 MG/.5ML
INJECTION, SOLUTION SUBCUTANEOUS
COMMUNITY
Start: 2021-10-10

## 2021-11-19 RX ORDER — ALUMINUM CHLORIDE 20 %
1 SOLUTION, NON-ORAL TOPICAL NIGHTLY
COMMUNITY
Start: 2021-07-15

## 2021-11-19 NOTE — PROGRESS NOTES
Chief Complaint:   Patient presents with: Follow - Up      HPI:   This is a 61year old male coming in for follow up     Patient has been feeling better. He did well over the summer and this fall with golfing.     See previous notes history of colectomy w • Thyroid disease Father    • Heart Disorder Father    • Hypertension Sister    • Thyroid disease Sister    • Skin cancer Sister    • Hypertension Brother    • Diabetes Brother    • Heart Disorder Brother    • Breast Cancer Maternal Grandmother    • Canc External Shampoo Apply 1 Application topically daily. • atorvastatin 10 MG Oral Tab Take 10 mg by mouth nightly.      • LOSARTAN POTASSIUM 50 MG Oral Tab TAKE 1 TABLET(50 MG) BY MOUTH DAILY 90 tablet 1   • Glucose Blood (CONTOUR NEXT TEST) In Vitro Stri 2 MG Oral Cap Take 1 capsule (2 mg total) by mouth 4 (four) times daily as needed for Diarrhea.  (Patient not taking: Reported on 11/19/2021) 120 capsule 5        Allergies:    Shellfish-Derived P*    NAUSEA AND VOMITING  Bacitracin              RASH turgor.     HEART:  Regular rate and rhythm, no murmurs,   LUNGS: Clear to auscultation bilterally, no rales/rhonchi/wheezing.     ABDOMEN:  Soft, nondistended, nontender, bowel sounds normal in all 4 quadrants, no masses, no hepatosplenomegaly.   BACK: No artery of native heart with angina pectoris (HCC)     Chronic right-sided low back pain without sciatica     Lumbar strain, initial encounter     Postsurgical percutaneous transluminal coronary angioplasty status     Scoliosis due to degenerative disease o

## 2022-01-10 NOTE — TELEPHONE ENCOUNTER
Future appt:     Your appointments     Date & Time Appointment Department [facility])    Nov 18, 2022  8:00 AM CST Physical - Established with Malvin Kellogg MD [address] [place] [address], [facility])            Edilberto Dorsey

## 2022-01-12 RX ORDER — DILTIAZEM HYDROCHLORIDE 120 MG/1
CAPSULE, EXTENDED RELEASE ORAL
Qty: 180 CAPSULE | Refills: 3 | OUTPATIENT
Start: 2022-01-12

## 2022-02-10 PROCEDURE — 3044F HG A1C LEVEL LT 7.0%: CPT | Performed by: FAMILY MEDICINE

## 2022-03-16 ENCOUNTER — TELEPHONE (OUTPATIENT)
Dept: FAMILY MEDICINE CLINIC | Facility: CLINIC | Age: 57
End: 2022-03-16

## 2022-04-22 RX ORDER — DILTIAZEM HYDROCHLORIDE 120 MG/1
120 CAPSULE, EXTENDED RELEASE ORAL 2 TIMES DAILY
Qty: 180 CAPSULE | Refills: 1 | Status: SHIPPED | OUTPATIENT
Start: 2022-04-22

## 2022-04-22 RX ORDER — DILTIAZEM HYDROCHLORIDE 120 MG/1
1 CAPSULE, EXTENDED RELEASE ORAL 2 TIMES DAILY
COMMUNITY
Start: 2022-01-04 | End: 2022-04-22

## 2022-04-22 NOTE — TELEPHONE ENCOUNTER
Verified with patient's wife the correct form of med is Diltiazem ER 120mg twice daily. Future appt: Your appointments     Date & Time Appointment Department [facility])    Nov 18, 2022  8:00 AM CST Physical - Established with Cata Narvaez MD [facility] ([facility])            25 [facility] [place]  [facility] [id number]  [phone]        Last Appointment with provider:   11/19/2021 for annual physical.  Last appointment at [facility]:  11/19/2021  Cholesterol, Total (mg/dL)   Date Value   02/26/2021 155     HDL Cholesterol (mg/dL)   Date Value   02/26/2021 47     LDL Cholesterol (mg/dL)   Date Value   02/26/2021 95     Triglycerides (mg/dL)   Date Value   02/26/2021 63     Lab Results   Component Value Date    A1C 6.1 10/07/2019     No results found for: T4F, TSH, TSHT4    No follow-ups on file.

## 2022-05-02 LAB
CREATININE: 68.7 MG/DL
MICROALBUMIN: <0.7

## 2022-06-29 PROCEDURE — 3051F HG A1C>EQUAL 7.0%<8.0%: CPT | Performed by: FAMILY MEDICINE

## 2022-09-30 RX ORDER — GEMFIBROZIL 600 MG/1
TABLET, FILM COATED ORAL
Qty: 180 TABLET | Refills: 2 | Status: SHIPPED | OUTPATIENT
Start: 2022-09-30

## 2022-09-30 RX ORDER — DILTIAZEM HYDROCHLORIDE 120 MG/1
CAPSULE, EXTENDED RELEASE ORAL
Qty: 180 CAPSULE | Refills: 1 | Status: SHIPPED | OUTPATIENT
Start: 2022-09-30

## 2022-09-30 NOTE — TELEPHONE ENCOUNTER
Future appt: Your appointments     Date & Time Appointment Department [facility])    Nov 18, 2022  8:00 AM CST Physical - Established with Alec Zacarias MD [address] [facility] ([facility])            25 [facility]  [phone]        Last Appointment with provider:  11/19/21 for HTN follow up. [place] [facility]  Visit date not found  Cholesterol, Total (mg/dL)   Date Value   02/26/2021 155     HDL Cholesterol (mg/dL)   Date Value   02/26/2021 47     LDL Cholesterol (mg/dL)   Date Value   02/26/2021 95     Triglycerides (mg/dL)   Date Value   02/26/2021 63     Lab Results   Component Value Date    A1C 6.1 10/07/2019     No results found for: T4F, TSH, TSHT4    No follow-ups on file.

## 2022-10-25 ENCOUNTER — TELEPHONE (OUTPATIENT)
Dept: FAMILY MEDICINE CLINIC | Facility: CLINIC | Age: 57
End: 2022-10-25

## 2022-10-25 NOTE — TELEPHONE ENCOUNTER
Patient is having surgery 11/3  needs a pre-op. according to patient Dr. Hermenia Hodgkins knows his history, and will see him. Suggestions?   Future Appointments   Date Time Provider Akilah Gutiérrez   11/18/2022  8:00 AM Ralph Del Toro MD [facility]

## 2022-10-25 NOTE — TELEPHONE ENCOUNTER
Pt has 2 significant hernias above where he had ostomy reversal surgery and is scheduled for surgery on 11/3/22. Dr. Dave Carver @ [facility] is doing surgery. Scheduled pt for 10/26 @ 1030  Calling for pre-op orders.

## 2022-10-25 NOTE — TELEPHONE ENCOUNTER
Spoke to Nohemi Pacheco @ Dr. [facility] Arias office, they do not fax pre-op orders to pcp office, it is up to the pcp on what they feel pt needs to be cleared for surgery.     Back line to Dr. Matilde Arias office [phone]  Fax - [phone]

## 2022-10-26 ENCOUNTER — OFFICE VISIT (OUTPATIENT)
Dept: FAMILY MEDICINE CLINIC | Facility: CLINIC | Age: 57
End: 2022-10-26
Payer: COMMERCIAL

## 2022-10-26 VITALS
DIASTOLIC BLOOD PRESSURE: 68 MMHG | OXYGEN SATURATION: 96 % | RESPIRATION RATE: 14 BRPM | TEMPERATURE: 98 F | BODY MASS INDEX: 30.49 KG/M2 | SYSTOLIC BLOOD PRESSURE: 116 MMHG | HEIGHT: 70 IN | HEART RATE: 84 BPM | WEIGHT: 213 LBS

## 2022-10-26 DIAGNOSIS — K43.2 INCISIONAL HERNIA, WITHOUT OBSTRUCTION OR GANGRENE: ICD-10-CM

## 2022-10-26 DIAGNOSIS — Z01.818 PREOPERATIVE EXAMINATION: Primary | ICD-10-CM

## 2022-10-26 PROCEDURE — 99243 OFF/OP CNSLTJ NEW/EST LOW 30: CPT | Performed by: FAMILY MEDICINE

## 2022-10-26 PROCEDURE — 3008F BODY MASS INDEX DOCD: CPT | Performed by: FAMILY MEDICINE

## 2022-10-26 PROCEDURE — 3078F DIAST BP <80 MM HG: CPT | Performed by: FAMILY MEDICINE

## 2022-10-26 PROCEDURE — 3074F SYST BP LT 130 MM HG: CPT | Performed by: FAMILY MEDICINE

## 2022-10-26 RX ORDER — EMPAGLIFLOZIN 10 MG/1
10 TABLET, FILM COATED ORAL DAILY
COMMUNITY
Start: 2022-10-01

## 2022-11-09 PROCEDURE — 3044F HG A1C LEVEL LT 7.0%: CPT | Performed by: FAMILY MEDICINE

## 2022-11-13 ENCOUNTER — PATIENT MESSAGE (OUTPATIENT)
Dept: FAMILY MEDICINE CLINIC | Facility: CLINIC | Age: 57
End: 2022-11-13

## 2022-11-14 NOTE — TELEPHONE ENCOUNTER
From: Jeff Anderson  To: Andrzej Platt MD  Sent: 11/13/2022 10:58 AM CST  Subject: Physical scheduled    I have a physical scheduled for this coming week, but since I was just in, do you feel this is necessary or still want to see me?     Thank you

## 2022-11-18 ENCOUNTER — OFFICE VISIT (OUTPATIENT)
Dept: FAMILY MEDICINE CLINIC | Facility: CLINIC | Age: 57
End: 2022-11-18
Payer: COMMERCIAL

## 2022-11-18 VITALS
BODY MASS INDEX: 30.49 KG/M2 | RESPIRATION RATE: 18 BRPM | OXYGEN SATURATION: 98 % | SYSTOLIC BLOOD PRESSURE: 122 MMHG | WEIGHT: 213 LBS | HEIGHT: 70 IN | DIASTOLIC BLOOD PRESSURE: 80 MMHG | HEART RATE: 75 BPM | TEMPERATURE: 98 F

## 2022-11-18 DIAGNOSIS — E78.49 FAMILIAL MULTIPLE LIPOPROTEIN-TYPE HYPERLIPIDEMIA: ICD-10-CM

## 2022-11-18 DIAGNOSIS — I10 ESSENTIAL HYPERTENSION, BENIGN: ICD-10-CM

## 2022-11-18 DIAGNOSIS — Z00.00 HEALTH CARE MAINTENANCE: Primary | ICD-10-CM

## 2022-11-18 PROCEDURE — 3079F DIAST BP 80-89 MM HG: CPT | Performed by: FAMILY MEDICINE

## 2022-11-18 PROCEDURE — 3074F SYST BP LT 130 MM HG: CPT | Performed by: FAMILY MEDICINE

## 2022-11-18 PROCEDURE — 3008F BODY MASS INDEX DOCD: CPT | Performed by: FAMILY MEDICINE

## 2022-11-18 PROCEDURE — 99396 PREV VISIT EST AGE 40-64: CPT | Performed by: FAMILY MEDICINE

## 2022-11-18 RX ORDER — INSULIN GLARGINE-YFGN 100 [IU]/ML
38 INJECTION, SOLUTION SUBCUTANEOUS DAILY
COMMUNITY
Start: 2022-09-27

## 2022-11-18 RX ORDER — DULAGLUTIDE 3 MG/.5ML
3 INJECTION, SOLUTION SUBCUTANEOUS WEEKLY
COMMUNITY
Start: 2022-09-20

## 2022-11-18 RX ORDER — TRETINOIN 0.5 MG/G
1 CREAM TOPICAL NIGHTLY
COMMUNITY
Start: 2022-03-16

## 2022-11-18 NOTE — PATIENT INSTRUCTIONS
Good exam        Continue with healthy nutrition       Ok for refills as needed       Track down last tetanus immunization   Consider shingles vaccination next year. Recheck in 1 year.

## 2023-02-03 ENCOUNTER — PATIENT MESSAGE (OUTPATIENT)
Dept: FAMILY MEDICINE CLINIC | Facility: CLINIC | Age: 58
End: 2023-02-03

## 2023-02-06 NOTE — TELEPHONE ENCOUNTER
From: Russ Chen  Sent: 2/3/2023 3:01 PM CST  To: Tonja Hidalgo Clinical Staff  Subject: Reminder - eye exam    I had my last appointment approximately 3-4 months ago with Dr Buchanan at [facility]     Thank you

## 2023-03-29 ENCOUNTER — TELEPHONE (OUTPATIENT)
Dept: FAMILY MEDICINE CLINIC | Facility: CLINIC | Age: 58
End: 2023-03-29

## 2023-03-29 RX ORDER — DILTIAZEM HYDROCHLORIDE 120 MG/1
CAPSULE, EXTENDED RELEASE ORAL
Qty: 180 CAPSULE | Refills: 2 | Status: SHIPPED | OUTPATIENT
Start: 2023-03-29 | End: 2023-05-05

## 2023-03-29 NOTE — TELEPHONE ENCOUNTER
Last Refill: 9/30/2022  Last Px: 11/18/2022  F/U Instructions: Recheck in 1 year. Future appt:    Last Appointment with provider:   11/18/2022  [place] [facility] [place]:  11[place]18/2022  Cholesterol, Total (mg/dL)   Date Value   02/26/2021 155     HDL Cholesterol (mg/dL)   Date Value   02/26/2021 47     LDL Cholesterol (mg/dL)   Date Value   02/26/2021 95     Triglycerides (mg/dL)   Date Value   02/26/2021 63     Lab Results   Component Value Date    A1C 6.1 10/07/2019     No results found for: T4F, TSH, TSHT4    No follow-ups on file.

## 2023-03-29 NOTE — TELEPHONE ENCOUNTER
Chart reviewed     Patient usually receives his diltiazem from dr. Demario Piper. Did this request come from patient or pharmacy ?

## 2023-03-29 NOTE — TELEPHONE ENCOUNTER
Called pt and he stated the provider's name on the medication bottle says Dr. Connor Villarreal MD.    This medication came from the pharmacy. Please advise.

## 2023-04-24 ENCOUNTER — OFFICE VISIT (OUTPATIENT)
Dept: FAMILY MEDICINE CLINIC | Facility: CLINIC | Age: 58
End: 2023-04-24
Payer: COMMERCIAL

## 2023-04-24 VITALS
WEIGHT: 211.38 LBS | TEMPERATURE: 97 F | RESPIRATION RATE: 16 BRPM | BODY MASS INDEX: 30.26 KG/M2 | DIASTOLIC BLOOD PRESSURE: 76 MMHG | SYSTOLIC BLOOD PRESSURE: 126 MMHG | HEIGHT: 70 IN | HEART RATE: 78 BPM | OXYGEN SATURATION: 98 %

## 2023-04-24 DIAGNOSIS — Z01.818 PREOPERATIVE EXAMINATION: Primary | ICD-10-CM

## 2023-04-24 PROBLEM — D23.9: Status: ACTIVE | Noted: 2023-04-12

## 2023-05-02 RX ORDER — DILTIAZEM HYDROCHLORIDE 120 MG/1
CAPSULE, EXTENDED RELEASE ORAL
Qty: 180 CAPSULE | Refills: 2 | OUTPATIENT
Start: 2023-05-02

## 2023-05-02 NOTE — TELEPHONE ENCOUNTER
RF given 3/29/2023 to Bailey Gastelum. Request denied with the notation to transfer if patient wishes.

## 2023-05-11 ENCOUNTER — TELEPHONE (OUTPATIENT)
Dept: FAMILY MEDICINE CLINIC | Facility: CLINIC | Age: 58
End: 2023-05-11

## 2023-05-31 ENCOUNTER — TELEPHONE (OUTPATIENT)
Dept: FAMILY MEDICINE CLINIC | Facility: CLINIC | Age: 58
End: 2023-05-31

## 2023-05-31 LAB — AMB EXT COVID-19 RESULT: DETECTED

## 2023-05-31 NOTE — TELEPHONE ENCOUNTER
Patient tested positive for covid today. Scheduled an appointment at 12:30 tomorrow. Wants to know if that will be too late to get the medication to take for covid.

## 2023-05-31 NOTE — TELEPHONE ENCOUNTER
The current guidelines for testing positive for COVID are to quarantine for 10 days. Day 0-5 do not leave the house. Day 6-10 can go out in public but must wear a mask and not go anywhere where the mask will come down like out to eat. Take tylenol for pain/fever unless otherwise contraindicated. Push fluids especially water, electrolyte replacement with Gatorade or similar. Take otc zinc, vitamin c, and vitamin d to boost immune system. Deep breathing exercises. Monitor O2 saturation at home if falls below 92%,  have SOB, or trouble breathing go to ED. Pt expressed wanting paxlovid for treatment due to pt health history as well as diabetic. Advised pt paxlovid is not as effective as it used to be against Covid, has several side effects, and interacts with several medications as well. Advised pt needs an appt to get the rx and if he wants to proceed with paxlovid he needs to keep appt tomorrow 6/1/23 with EM.

## 2023-06-01 ENCOUNTER — TELEMEDICINE (OUTPATIENT)
Dept: FAMILY MEDICINE CLINIC | Facility: CLINIC | Age: 58
End: 2023-06-01
Payer: COMMERCIAL

## 2023-06-01 VITALS — WEIGHT: 210 LBS | HEIGHT: 70 IN | TEMPERATURE: 98 F | BODY MASS INDEX: 30.06 KG/M2

## 2023-06-01 DIAGNOSIS — R05.1 ACUTE COUGH: ICD-10-CM

## 2023-06-01 DIAGNOSIS — R51.9 ACUTE NONINTRACTABLE HEADACHE, UNSPECIFIED HEADACHE TYPE: ICD-10-CM

## 2023-06-01 DIAGNOSIS — R09.81 CONGESTION OF NASAL SINUS: ICD-10-CM

## 2023-06-01 DIAGNOSIS — U07.1 POSITIVE SELF-ADMINISTERED ANTIGEN TEST FOR COVID-19: Primary | ICD-10-CM

## 2023-06-01 PROCEDURE — 99214 OFFICE O/P EST MOD 30 MIN: CPT

## 2023-06-01 NOTE — PATIENT INSTRUCTIONS
Symptom control, recommend over-the-counter medications such as Mucinex for cough, and congestion. Ibuprofen if the patient is able to take it. Increase water intake. I recommend if patient is able to, prone while sleeping.   tylenol for fever, and enviornmental interventions to help decrease fever if needed  Will go to ED if having any SOB, difficulty breathing or chest pain  Activity as able, encourage DB and C.

## 2023-06-27 RX ORDER — GEMFIBROZIL 600 MG/1
TABLET, FILM COATED ORAL
Qty: 180 TABLET | Refills: 0 | Status: SHIPPED | OUTPATIENT
Start: 2023-06-27

## 2023-09-25 DIAGNOSIS — E78.49 FAMILIAL MULTIPLE LIPOPROTEIN-TYPE HYPERLIPIDEMIA: Primary | ICD-10-CM

## 2023-09-25 RX ORDER — GEMFIBROZIL 600 MG/1
TABLET, FILM COATED ORAL
Qty: 180 TABLET | Refills: 3 | Status: SHIPPED | OUTPATIENT
Start: 2023-09-25

## 2023-09-25 NOTE — TELEPHONE ENCOUNTER
Last refill - 6/27/23 #180 w/0 refills    Last px - 2/1/23    Future appt:    Last Appointment with provider:   4/24/2023 - pre-op  Last appointment at [facility] [place]:  4/24/2023    Cholesterol, Total (mg/dL)   Date Value   02/26/2021 155     HDL Cholesterol (mg/dL)   Date Value   02/26/2021 47     LDL Cholesterol (mg/dL)   Date Value   02/26/2021 95     Triglycerides (mg/dL)   Date Value   02/26/2021 63     Lab Results   Component Value Date    A1C 6.1 10/07/2019     No results found for: \"T4F\", \"TSH\", \"TSHT4\"    No follow-ups on file.

## (undated) NOTE — LETTER
01/08/20        Quintin Nicholson      Dear Logan Lozoya,    [id number] records indicate that you have outstanding lab work and or testing that was ordered for you and has not yet been completed:  Orders Placed This Encounter      PS

## (undated) NOTE — LETTER
02/11/21        Cherylene Lints  [address]      Dear Lima Courser records indicate that you have outstanding lab work and or testing that was ordered for you and has not yet been completed:  Orders Placed This Encounter      CB